# Patient Record
Sex: MALE | Race: WHITE | NOT HISPANIC OR LATINO | Employment: OTHER | URBAN - METROPOLITAN AREA
[De-identification: names, ages, dates, MRNs, and addresses within clinical notes are randomized per-mention and may not be internally consistent; named-entity substitution may affect disease eponyms.]

---

## 2018-09-26 ENCOUNTER — HOSPITAL ENCOUNTER (EMERGENCY)
Facility: HOSPITAL | Age: 73
Discharge: HOME | End: 2018-09-27
Attending: EMERGENCY MEDICINE | Admitting: EMERGENCY MEDICINE

## 2018-09-26 ENCOUNTER — APPOINTMENT (OUTPATIENT)
Dept: CT IMAGING | Facility: HOSPITAL | Age: 73
End: 2018-09-26

## 2018-09-26 DIAGNOSIS — R55 SYNCOPE, UNSPECIFIED SYNCOPE TYPE: Primary | ICD-10-CM

## 2018-09-26 DIAGNOSIS — R91.1 PULMONARY NODULE: ICD-10-CM

## 2018-09-26 DIAGNOSIS — R79.89 ELEVATED D-DIMER: ICD-10-CM

## 2018-09-26 LAB
ALBUMIN SERPL-MCNC: 4.43 G/DL (ref 3.2–4.8)
ALBUMIN/GLOB SERPL: 2 G/DL (ref 1.5–2.5)
ALP SERPL-CCNC: 78 U/L (ref 25–100)
ALT SERPL W P-5'-P-CCNC: 28 U/L (ref 7–40)
ANION GAP SERPL CALCULATED.3IONS-SCNC: 6 MMOL/L (ref 3–11)
APTT PPP: 25.4 SECONDS (ref 24–31)
AST SERPL-CCNC: 23 U/L (ref 0–33)
BASOPHILS # BLD AUTO: 0.03 10*3/MM3 (ref 0–0.2)
BASOPHILS NFR BLD AUTO: 0.5 % (ref 0–1)
BILIRUB SERPL-MCNC: 0.9 MG/DL (ref 0.3–1.2)
BUN BLD-MCNC: 18 MG/DL (ref 9–23)
BUN/CREAT SERPL: 23.4 (ref 7–25)
CALCIUM SPEC-SCNC: 9.3 MG/DL (ref 8.7–10.4)
CHLORIDE SERPL-SCNC: 104 MMOL/L (ref 99–109)
CO2 SERPL-SCNC: 30 MMOL/L (ref 20–31)
CREAT BLD-MCNC: 0.77 MG/DL (ref 0.6–1.3)
D DIMER PPP FEU-MCNC: 0.92 MG/L (FEU) (ref 0–0.5)
DEPRECATED RDW RBC AUTO: 48.8 FL (ref 37–54)
EOSINOPHIL # BLD AUTO: 0.37 10*3/MM3 (ref 0–0.3)
EOSINOPHIL NFR BLD AUTO: 6.7 % (ref 0–3)
ERYTHROCYTE [DISTWIDTH] IN BLOOD BY AUTOMATED COUNT: 13.7 % (ref 11.3–14.5)
GFR SERPL CREATININE-BSD FRML MDRD: 100 ML/MIN/1.73
GLOBULIN UR ELPH-MCNC: 2.2 GM/DL
GLUCOSE BLD-MCNC: 112 MG/DL (ref 70–100)
GLUCOSE BLDC GLUCOMTR-MCNC: 105 MG/DL (ref 70–130)
HCT VFR BLD AUTO: 44.4 % (ref 38.9–50.9)
HGB BLD-MCNC: 14.4 G/DL (ref 13.1–17.5)
IMM GRANULOCYTES # BLD: 0.01 10*3/MM3 (ref 0–0.03)
IMM GRANULOCYTES NFR BLD: 0.2 % (ref 0–0.6)
INR PPP: 1.05 (ref 0.91–1.09)
LYMPHOCYTES # BLD AUTO: 1.81 10*3/MM3 (ref 0.6–4.8)
LYMPHOCYTES NFR BLD AUTO: 32.9 % (ref 24–44)
MAGNESIUM SERPL-MCNC: 2 MG/DL (ref 1.3–2.7)
MCH RBC QN AUTO: 31.5 PG (ref 27–31)
MCHC RBC AUTO-ENTMCNC: 32.4 G/DL (ref 32–36)
MCV RBC AUTO: 97.2 FL (ref 80–99)
MONOCYTES # BLD AUTO: 0.42 10*3/MM3 (ref 0–1)
MONOCYTES NFR BLD AUTO: 7.6 % (ref 0–12)
NEUTROPHILS # BLD AUTO: 2.87 10*3/MM3 (ref 1.5–8.3)
NEUTROPHILS NFR BLD AUTO: 52.3 % (ref 41–71)
PLATELET # BLD AUTO: 172 10*3/MM3 (ref 150–450)
PMV BLD AUTO: 10.8 FL (ref 6–12)
POTASSIUM BLD-SCNC: 3.9 MMOL/L (ref 3.5–5.5)
PROT SERPL-MCNC: 6.6 G/DL (ref 5.7–8.2)
PROTHROMBIN TIME: 11 SECONDS (ref 9.6–11.5)
RBC # BLD AUTO: 4.57 10*6/MM3 (ref 4.2–5.76)
SODIUM BLD-SCNC: 140 MMOL/L (ref 132–146)
T4 FREE SERPL-MCNC: 1.36 NG/DL (ref 0.89–1.76)
TROPONIN I SERPL-MCNC: 0.04 NG/ML (ref 0–0.07)
TSH SERPL DL<=0.05 MIU/L-ACNC: 4.05 MIU/ML (ref 0.35–5.35)
WBC NRBC COR # BLD: 5.5 10*3/MM3 (ref 3.5–10.8)

## 2018-09-26 PROCEDURE — 96360 HYDRATION IV INFUSION INIT: CPT

## 2018-09-26 PROCEDURE — 85025 COMPLETE CBC W/AUTO DIFF WBC: CPT | Performed by: EMERGENCY MEDICINE

## 2018-09-26 PROCEDURE — 99285 EMERGENCY DEPT VISIT HI MDM: CPT

## 2018-09-26 PROCEDURE — 85379 FIBRIN DEGRADATION QUANT: CPT | Performed by: EMERGENCY MEDICINE

## 2018-09-26 PROCEDURE — 84443 ASSAY THYROID STIM HORMONE: CPT | Performed by: EMERGENCY MEDICINE

## 2018-09-26 PROCEDURE — 84439 ASSAY OF FREE THYROXINE: CPT | Performed by: EMERGENCY MEDICINE

## 2018-09-26 PROCEDURE — 85610 PROTHROMBIN TIME: CPT | Performed by: EMERGENCY MEDICINE

## 2018-09-26 PROCEDURE — 70450 CT HEAD/BRAIN W/O DYE: CPT

## 2018-09-26 PROCEDURE — 85730 THROMBOPLASTIN TIME PARTIAL: CPT | Performed by: EMERGENCY MEDICINE

## 2018-09-26 PROCEDURE — 80053 COMPREHEN METABOLIC PANEL: CPT | Performed by: EMERGENCY MEDICINE

## 2018-09-26 PROCEDURE — 83735 ASSAY OF MAGNESIUM: CPT | Performed by: EMERGENCY MEDICINE

## 2018-09-26 PROCEDURE — 82962 GLUCOSE BLOOD TEST: CPT

## 2018-09-26 PROCEDURE — 84484 ASSAY OF TROPONIN QUANT: CPT

## 2018-09-26 PROCEDURE — 93005 ELECTROCARDIOGRAM TRACING: CPT | Performed by: EMERGENCY MEDICINE

## 2018-09-26 RX ORDER — SODIUM CHLORIDE 0.9 % (FLUSH) 0.9 %
10 SYRINGE (ML) INJECTION AS NEEDED
Status: DISCONTINUED | OUTPATIENT
Start: 2018-09-26 | End: 2018-09-27 | Stop reason: HOSPADM

## 2018-09-26 RX ORDER — MELATONIN
2000 DAILY
COMMUNITY

## 2018-09-26 RX ORDER — MELOXICAM 15 MG/1
15 TABLET ORAL DAILY
COMMUNITY
End: 2020-06-15 | Stop reason: SDUPTHER

## 2018-09-26 RX ADMIN — SODIUM CHLORIDE 1000 ML: 9 INJECTION, SOLUTION INTRAVENOUS at 22:53

## 2018-09-27 ENCOUNTER — OFFICE VISIT (OUTPATIENT)
Dept: CARDIOLOGY | Facility: HOSPITAL | Age: 73
End: 2018-09-27

## 2018-09-27 ENCOUNTER — APPOINTMENT (OUTPATIENT)
Dept: CT IMAGING | Facility: HOSPITAL | Age: 73
End: 2018-09-27

## 2018-09-27 ENCOUNTER — HOSPITAL ENCOUNTER (OUTPATIENT)
Dept: CARDIOLOGY | Facility: HOSPITAL | Age: 73
Discharge: HOME | End: 2018-09-27

## 2018-09-27 ENCOUNTER — HOSPITAL ENCOUNTER (OUTPATIENT)
Dept: CARDIOLOGY | Facility: HOSPITAL | Age: 73
Discharge: HOME | End: 2018-09-27
Admitting: NURSE PRACTITIONER

## 2018-09-27 VITALS
BODY MASS INDEX: 27.76 KG/M2 | DIASTOLIC BLOOD PRESSURE: 49 MMHG | WEIGHT: 228 LBS | HEIGHT: 76 IN | SYSTOLIC BLOOD PRESSURE: 97 MMHG

## 2018-09-27 VITALS
DIASTOLIC BLOOD PRESSURE: 80 MMHG | OXYGEN SATURATION: 93 % | HEART RATE: 58 BPM | RESPIRATION RATE: 18 BRPM | HEIGHT: 76 IN | SYSTOLIC BLOOD PRESSURE: 127 MMHG | WEIGHT: 220 LBS | TEMPERATURE: 97.8 F | BODY MASS INDEX: 26.79 KG/M2

## 2018-09-27 VITALS
HEIGHT: 76 IN | RESPIRATION RATE: 18 BRPM | SYSTOLIC BLOOD PRESSURE: 108 MMHG | HEART RATE: 85 BPM | WEIGHT: 228 LBS | DIASTOLIC BLOOD PRESSURE: 67 MMHG | BODY MASS INDEX: 27.76 KG/M2 | TEMPERATURE: 97.6 F | OXYGEN SATURATION: 96 %

## 2018-09-27 DIAGNOSIS — I95.1 ORTHOSTATIC HYPOTENSION: ICD-10-CM

## 2018-09-27 DIAGNOSIS — R55 SYNCOPE, UNSPECIFIED SYNCOPE TYPE: ICD-10-CM

## 2018-09-27 DIAGNOSIS — R55 SYNCOPE, UNSPECIFIED SYNCOPE TYPE: Primary | ICD-10-CM

## 2018-09-27 DIAGNOSIS — R91.8 PULMONARY NODULES: ICD-10-CM

## 2018-09-27 PROBLEM — E55.9 VITAMIN D DEFICIENCY: Status: ACTIVE | Noted: 2018-09-27

## 2018-09-27 PROBLEM — I95.9 ARTERIAL HYPOTENSION: Status: ACTIVE | Noted: 2018-09-27

## 2018-09-27 PROBLEM — M19.90 ARTHRITIS: Status: ACTIVE | Noted: 2018-09-27

## 2018-09-27 LAB
BILIRUB UR QL STRIP: NEGATIVE
CLARITY UR: CLEAR
COLOR UR: YELLOW
GLUCOSE UR STRIP-MCNC: NEGATIVE MG/DL
HGB UR QL STRIP.AUTO: NEGATIVE
HOLD SPECIMEN: NORMAL
HOLD SPECIMEN: NORMAL
KETONES UR QL STRIP: ABNORMAL
LEUKOCYTE ESTERASE UR QL STRIP.AUTO: NEGATIVE
NITRITE UR QL STRIP: NEGATIVE
PH UR STRIP.AUTO: <=5 [PH] (ref 5–8)
PROT UR QL STRIP: NEGATIVE
SP GR UR STRIP: 1.02 (ref 1–1.03)
TROPONIN I SERPL-MCNC: 0 NG/ML (ref 0–0.07)
UROBILINOGEN UR QL STRIP: ABNORMAL
WHOLE BLOOD HOLD SPECIMEN: NORMAL
WHOLE BLOOD HOLD SPECIMEN: NORMAL

## 2018-09-27 PROCEDURE — 93786 AMBL BP MNTR W/SW REC ONLY: CPT

## 2018-09-27 PROCEDURE — 84484 ASSAY OF TROPONIN QUANT: CPT

## 2018-09-27 PROCEDURE — 93005 ELECTROCARDIOGRAM TRACING: CPT | Performed by: EMERGENCY MEDICINE

## 2018-09-27 PROCEDURE — 71275 CT ANGIOGRAPHY CHEST: CPT

## 2018-09-27 PROCEDURE — 93270 REMOTE 30 DAY ECG REV/REPORT: CPT

## 2018-09-27 PROCEDURE — 99204 OFFICE O/P NEW MOD 45 MIN: CPT | Performed by: NURSE PRACTITIONER

## 2018-09-27 PROCEDURE — 93306 TTE W/DOPPLER COMPLETE: CPT

## 2018-09-27 PROCEDURE — 0 IOPAMIDOL PER 1 ML: Performed by: EMERGENCY MEDICINE

## 2018-09-27 PROCEDURE — 81003 URINALYSIS AUTO W/O SCOPE: CPT | Performed by: EMERGENCY MEDICINE

## 2018-09-27 RX ADMIN — IOPAMIDOL 75 ML: 755 INJECTION, SOLUTION INTRAVENOUS at 00:40

## 2018-09-27 NOTE — PROGRESS NOTES
Encounter Date:09/27/2018      Patient ID: Chris Velásquez is a 72 y.o. male.        Subjective:     Chief Complaint: Establish Care (s/p  ED visit for ) and Loss of Consciousness     History of Present Illness patient presents to the office today for ongoing evaluation of his syncopal event. He notes that he noted to his wife last night around 10 pm that he did not feel well while watching tv. He noted dizziness and lightheadedness while sitting on the couch. His wife witnessed his syncopal spell and she reports that he passed out onto the couch. No loss of bowel or bladder. She noted that he was unresponsive for roughly 1 minute. After syncopal spell, he was pale and diaphoretic. He denied cp or palpitations prior to the event. In route to the ED, EMS reported no outstanding vital findings with the exception of orthostatic hypotension and bradycardia (105/65 sitting, 98/66 standing, HR around 54-56 BPM). While in the ED the patient complained only of lingering nausea, stating that he otherwise feels fine. He denies any atypical occurrences during the day prior to this incident and additionally denies any similar presentation within the past.      The patient denies chest pain, palpitations, shortness of breath, cough, congestion, rhinorrhea, hematuria, hematochezia, dysuria, urgency, frequency, melena, or any other acute complaints at this time. Witnesses deny jerking,seizure-like activity, fall, or head trauma during the patient's syncopal episode.   Patient notes a long history of orthostatic hypotension with hx of syncope after standing for long periods of time when he was younger. He reports that this episode yesterday was different than those in the past.     Problem   Syncope   Vitamin D Deficiency   Orthostatic Hypotension   Arthritis       Past Surgical History:   Procedure Laterality Date   • ROTATOR CUFF REPAIR     • SHOULDER SURGERY      RIGHT       Allergies   Allergen Reactions   • Penicillins Hives          Current Outpatient Prescriptions:   •  cholecalciferol (VITAMIN D3) 1000 units tablet, Take 2,000 Units by mouth Daily., Disp: , Rfl:   •  meloxicam (MOBIC) 15 MG tablet, Take 15 mg by mouth Daily., Disp: , Rfl:   •  Misc Natural Products (OSTEO BI-FLEX JOINT SHIELD PO), Take 1 tablet by mouth Daily., Disp: , Rfl:   No current facility-administered medications for this visit.     The following portions of the chart were reviewed and updated as appropriate: Allergies, current medications, past family history, social history, past medical history.     Review of Systems   Constitution: Negative for chills, decreased appetite, diaphoresis, fever, weakness, malaise/fatigue, night sweats, weight gain and weight loss.   HENT: Negative for congestion, hearing loss, hoarse voice and nosebleeds.    Eyes: Negative for blurred vision, visual disturbance and visual halos.   Cardiovascular: Positive for syncope. Negative for chest pain, claudication, cyanosis, dyspnea on exertion, irregular heartbeat, leg swelling, near-syncope, orthopnea, palpitations and paroxysmal nocturnal dyspnea.   Respiratory: Negative for cough, hemoptysis, shortness of breath, sleep disturbances due to breathing, snoring, sputum production and wheezing.    Hematologic/Lymphatic: Negative for bleeding problem. Does not bruise/bleed easily.   Skin: Negative for dry skin, itching and rash.   Musculoskeletal: Positive for arthritis. Negative for falls, joint pain, joint swelling and myalgias.   Gastrointestinal: Negative for bloating, abdominal pain, constipation, diarrhea, flatus, heartburn, hematemesis, hematochezia, melena, nausea and vomiting.   Genitourinary: Negative for dysuria, frequency, hematuria, nocturia and urgency.   Neurological: Negative for excessive daytime sleepiness, dizziness, headaches, light-headedness and loss of balance.   Psychiatric/Behavioral: Negative for depression. The patient does not have insomnia and is not  "nervous/anxious.            Objective:     Vitals:    09/27/18 1331 09/27/18 1333 09/27/18 1334   BP: 108/72 109/77 108/67   BP Location: Right arm Left arm Left arm   Patient Position: Sitting Sitting Standing   Cuff Size: Adult     Pulse: 80 75 85   Resp: 18     Temp: 97.6 °F (36.4 °C)     TempSrc: Temporal Artery      SpO2: 96%     Weight: 103 kg (228 lb)     Height: 193 cm (76\")           Physical Exam   Constitutional: He is oriented to person, place, and time. He appears well-developed and well-nourished. He is active and cooperative. No distress.   HENT:   Head: Normocephalic and atraumatic.   Mouth/Throat: Oropharynx is clear and moist.   Eyes: Pupils are equal, round, and reactive to light. Conjunctivae and EOM are normal.   Neck: Normal range of motion. Neck supple. No JVD present. No tracheal deviation present. No thyromegaly present.   Cardiovascular: Normal rate, regular rhythm, normal heart sounds and intact distal pulses.    Pulmonary/Chest: Effort normal and breath sounds normal.   Abdominal: Soft. Bowel sounds are normal. He exhibits no distension. There is no tenderness.   Musculoskeletal: Normal range of motion.   Neurological: He is alert and oriented to person, place, and time.   Skin: Skin is warm, dry and intact.   Psychiatric: He has a normal mood and affect. His behavior is normal.   Nursing note and vitals reviewed.      Lab and Diagnostic Review:      Results for orders placed or performed during the hospital encounter of 09/26/18   Comprehensive Metabolic Panel   Result Value Ref Range    Glucose 112 (H) 70 - 100 mg/dL    BUN 18 9 - 23 mg/dL    Creatinine 0.77 0.60 - 1.30 mg/dL    Sodium 140 132 - 146 mmol/L    Potassium 3.9 3.5 - 5.5 mmol/L    Chloride 104 99 - 109 mmol/L    CO2 30.0 20.0 - 31.0 mmol/L    Calcium 9.3 8.7 - 10.4 mg/dL    Total Protein 6.6 5.7 - 8.2 g/dL    Albumin 4.43 3.20 - 4.80 g/dL    ALT (SGPT) 28 7 - 40 U/L    AST (SGOT) 23 0 - 33 U/L    Alkaline Phosphatase 78 25 - " 100 U/L    Total Bilirubin 0.9 0.3 - 1.2 mg/dL    eGFR Non African Amer 100 >60 mL/min/1.73    Globulin 2.2 gm/dL    A/G Ratio 2.0 1.5 - 2.5 g/dL    BUN/Creatinine Ratio 23.4 7.0 - 25.0    Anion Gap 6.0 3.0 - 11.0 mmol/L   Magnesium   Result Value Ref Range    Magnesium 2.0 1.3 - 2.7 mg/dL   CBC Auto Differential   Result Value Ref Range    WBC 5.50 3.50 - 10.80 10*3/mm3    RBC 4.57 4.20 - 5.76 10*6/mm3    Hemoglobin 14.4 13.1 - 17.5 g/dL    Hematocrit 44.4 38.9 - 50.9 %    MCV 97.2 80.0 - 99.0 fL    MCH 31.5 (H) 27.0 - 31.0 pg    MCHC 32.4 32.0 - 36.0 g/dL    RDW 13.7 11.3 - 14.5 %    RDW-SD 48.8 37.0 - 54.0 fl    MPV 10.8 6.0 - 12.0 fL    Platelets 172 150 - 450 10*3/mm3    Neutrophil % 52.3 41.0 - 71.0 %    Lymphocyte % 32.9 24.0 - 44.0 %    Monocyte % 7.6 0.0 - 12.0 %    Eosinophil % 6.7 (H) 0.0 - 3.0 %    Basophil % 0.5 0.0 - 1.0 %    Immature Grans % 0.2 0.0 - 0.6 %    Neutrophils, Absolute 2.87 1.50 - 8.30 10*3/mm3    Lymphocytes, Absolute 1.81 0.60 - 4.80 10*3/mm3    Monocytes, Absolute 0.42 0.00 - 1.00 10*3/mm3    Eosinophils, Absolute 0.37 (H) 0.00 - 0.30 10*3/mm3    Basophils, Absolute 0.03 0.00 - 0.20 10*3/mm3    Immature Grans, Absolute 0.01 0.00 - 0.03 10*3/mm3   Protime-INR   Result Value Ref Range    Protime 11.0 9.6 - 11.5 Seconds    INR 1.05 0.91 - 1.09   aPTT   Result Value Ref Range    PTT 25.4 24.0 - 31.0 seconds   T4, Free   Result Value Ref Range    Free T4 1.36 0.89 - 1.76 ng/dL   TSH   Result Value Ref Range    TSH 4.055 0.350 - 5.350 mIU/mL   Urinalysis With Microscopic If Indicated (No Culture) - Urine, Clean Catch   Result Value Ref Range    Color, UA Yellow Yellow, Straw    Appearance, UA Clear Clear    pH, UA <=5.0 5.0 - 8.0    Specific Gravity, UA 1.020 1.001 - 1.030    Glucose, UA Negative Negative    Ketones, UA Trace (A) Negative    Bilirubin, UA Negative Negative    Blood, UA Negative Negative    Protein, UA Negative Negative    Leuk Esterase, UA Negative Negative    Nitrite, UA  Negative Negative    Urobilinogen, UA 0.2 E.U./dL 0.2 - 1.0 E.U./dL   D-dimer, Quantitative   Result Value Ref Range    D-Dimer, Quantitative 0.92 (H) 0.00 - 0.50 mg/L (FEU)   POC Glucose Once   Result Value Ref Range    Glucose 105 70 - 130 mg/dL   POC Troponin, Rapid   Result Value Ref Range    Troponin I 0.04 0.00 - 0.07 ng/mL   POC Troponin, Rapid   Result Value Ref Range    Troponin I 0.00 0.00 - 0.07 ng/mL   EKG 9/26/18: Sinus bradycardia with sinus arrhythmia at 52 bpm  EKG 2nd set: NSR at 62 bpm  Ct Head:   Brain:  Mild decreased attenuation of the supratentorial white matter is   likely secondary to chronic microvascular ischemia.  No hemorrhage.    Ventricles:  Ventricular and subarachnoid spaces are age appropriate.    Bones/joints:  Unremarkable.  No acute fracture.    Soft tissues:  Unremarkable.    Sinuses:  Mild paranasal sinus disease.    Mastoid air cells:  Unremarkable as visualized.  No mastoid effusion.   CT chest: No evidence of pulmonary embolus.       5 mm right middle lobe nodule and 6 mm right lower lobe nodule.  For low-risk   patients recommend follow-up chest CT at 3-6 months.  If unchanged consider an   additional follow-up CT at 18-24 months.  For high-risk patients (smoking   history or other known risk factors) initial follow-up chest CT at 3-6 months   and if unchanged, 18-24 months.       Assessment and Plan:         1. Syncope, unspecified syncope type    - Cardiac Event Monitor to rule out SSS, pauses and arrhthymias  - 24 Hour Blood Pressure Monitor  - Adult Transthoracic Echo Complete W/ Cont if Necessary Per Protocol; Future    2. Orthostatic hypotension  24 hour blood pressure monitor    3. Pulmonary nodules  Patient to establish with pulmonary in the near future    Ongoing plan of care based on results of above mentioned tests    It has been a pleasure to participate in the care of this patient.  Patient was instructed to call [facility] with any questions,  concerns, or worsening symptoms.    * Please note that portions of this note were completed with a voice recognition program. Efforts were made to edit the dictation but occasionally words are transcribed.

## 2018-09-28 ENCOUNTER — TELEPHONE (OUTPATIENT)
Dept: CARDIOLOGY | Facility: HOSPITAL | Age: 73
End: 2018-09-28

## 2018-09-28 ENCOUNTER — DOCUMENTATION (OUTPATIENT)
Dept: CARDIOLOGY | Facility: HOSPITAL | Age: 73
End: 2018-09-28

## 2018-09-28 LAB
BH CV ECHO MEAS - AO ROOT AREA (BSA CORRECTED): 1.5
BH CV ECHO MEAS - AO ROOT AREA: 9.6 CM^2
BH CV ECHO MEAS - AO ROOT DIAM: 3.5 CM
BH CV ECHO MEAS - BSA(HAYCOCK): 2.3 M^2
BH CV ECHO MEAS - BSA: 2.3 M^2
BH CV ECHO MEAS - BZI_BMI: 29.3 KILOGRAMS/M^2
BH CV ECHO MEAS - BZI_METRIC_HEIGHT: 188 CM
BH CV ECHO MEAS - BZI_METRIC_WEIGHT: 103.4 KG
BH CV ECHO MEAS - EDV(CUBED): 151.3 ML
BH CV ECHO MEAS - EDV(MOD-SP2): 143 ML
BH CV ECHO MEAS - EDV(MOD-SP4): 157 ML
BH CV ECHO MEAS - EDV(TEICH): 137 ML
BH CV ECHO MEAS - EF(CUBED): 74.5 %
BH CV ECHO MEAS - EF(MOD-SP2): 53.1 %
BH CV ECHO MEAS - EF(MOD-SP4): 65 %
BH CV ECHO MEAS - EF(TEICH): 65.9 %
BH CV ECHO MEAS - ESV(CUBED): 38.5 ML
BH CV ECHO MEAS - ESV(MOD-SP2): 67 ML
BH CV ECHO MEAS - ESV(MOD-SP4): 55 ML
BH CV ECHO MEAS - ESV(TEICH): 46.7 ML
BH CV ECHO MEAS - FS: 36.6 %
BH CV ECHO MEAS - IVS/LVPW: 1
BH CV ECHO MEAS - IVSD: 1.1 CM
BH CV ECHO MEAS - LA DIMENSION: 4.5 CM
BH CV ECHO MEAS - LA/AO: 1.3
BH CV ECHO MEAS - LAD MAJOR: 6.7 CM
BH CV ECHO MEAS - LAT PEAK E' VEL: 12 CM/SEC
BH CV ECHO MEAS - LATERAL E/E' RATIO: 5.2
BH CV ECHO MEAS - LV DIASTOLIC VOL/BSA (35-75): 68.3 ML/M^2
BH CV ECHO MEAS - LV MASS(C)D: 238.3 GRAMS
BH CV ECHO MEAS - LV MASS(C)DI: 103.7 GRAMS/M^2
BH CV ECHO MEAS - LV MAX PG: 4 MMHG
BH CV ECHO MEAS - LV MEAN PG: 1.9 MMHG
BH CV ECHO MEAS - LV SYSTOLIC VOL/BSA (12-30): 23.9 ML/M^2
BH CV ECHO MEAS - LV V1 MAX: 100.2 CM/SEC
BH CV ECHO MEAS - LV V1 MEAN: 62.6 CM/SEC
BH CV ECHO MEAS - LV V1 VTI: 27.3 CM
BH CV ECHO MEAS - LVIDD: 5.3 CM
BH CV ECHO MEAS - LVIDS: 3.4 CM
BH CV ECHO MEAS - LVLD AP2: 9 CM
BH CV ECHO MEAS - LVLD AP4: 9.7 CM
BH CV ECHO MEAS - LVLS AP2: 7.5 CM
BH CV ECHO MEAS - LVLS AP4: 7.3 CM
BH CV ECHO MEAS - LVOT AREA (M): 4.2 CM^2
BH CV ECHO MEAS - LVOT AREA: 4 CM^2
BH CV ECHO MEAS - LVOT DIAM: 2.3 CM
BH CV ECHO MEAS - LVPWD: 1.1 CM
BH CV ECHO MEAS - MED PEAK E' VEL: 7.8 CM/SEC
BH CV ECHO MEAS - MEDIAL E/E' RATIO: 8
BH CV ECHO MEAS - MV A MAX VEL: 72.6 CM/SEC
BH CV ECHO MEAS - MV E MAX VEL: 63.7 CM/SEC
BH CV ECHO MEAS - MV E/A: 0.88
BH CV ECHO MEAS - PA ACC SLOPE: 602.9 CM/SEC^2
BH CV ECHO MEAS - PA ACC TIME: 0.16 SEC
BH CV ECHO MEAS - PA PR(ACCEL): 5.3 MMHG
BH CV ECHO MEAS - RAP SYSTOLE: 8 MMHG
BH CV ECHO MEAS - RVDD: 2.7 CM
BH CV ECHO MEAS - RVSP: 33 MMHG
BH CV ECHO MEAS - SI(CUBED): 49.1 ML/M^2
BH CV ECHO MEAS - SI(LVOT): 47.8 ML/M^2
BH CV ECHO MEAS - SI(MOD-SP2): 33.1 ML/M^2
BH CV ECHO MEAS - SI(MOD-SP4): 44.4 ML/M^2
BH CV ECHO MEAS - SI(TEICH): 39.3 ML/M^2
BH CV ECHO MEAS - SV(CUBED): 112.8 ML
BH CV ECHO MEAS - SV(LVOT): 109.8 ML
BH CV ECHO MEAS - SV(MOD-SP2): 76 ML
BH CV ECHO MEAS - SV(MOD-SP4): 102 ML
BH CV ECHO MEAS - SV(TEICH): 90.4 ML
BH CV ECHO MEAS - TR MAX PG: 25 MMHG
BH CV ECHO MEAS - TR MAX VEL: 249.9 CM/SEC
BH CV ECHO MEASUREMENTS AVERAGE E/E' RATIO: 6.43
BH CV VAS BP LEFT ARM: NORMAL MMHG
BH CV XLRA - RV BASE: 4.2 CM
BH CV XLRA - RV LENGTH: 6.3 CM
BH CV XLRA - RV MID: 3.5 CM
BH CV XLRA - TDI S': 11.5 CM/SEC
LEFT ATRIUM VOLUME INDEX: 40 ML/M^2
LV EF 2D ECHO EST: 65 %
MAXIMAL PREDICTED HEART RATE: 150 BPM
STRESS TARGET HR: 128 BPM

## 2018-09-28 NOTE — PROGRESS NOTES
[]09/27/18    Reason for Visit to [facility]: 24hr. Ambulatory Blood Pressure Monitor.    Mr. Velásquez saw Provider Deja Lara on 9/27/18 she ordered several tests, including a 24hr ambulatory blood pressure monitor, which he comes in for today.      The monitor was placed on his left arm & he was instructed in its use and had no questions.      He was provided with written instructions to refer to as well.  He will wear the monitor until tomorrow around 1500.      He will bring it back to our office tomorrow or mail it to us for download.      Results will be forwarded to Provider Deja Lara for analysis and treatment adjustment if indicated.    Mady Rogers 09/28/18 3:54 PM                         09/28/19  A 24 Hr. Ambulatory Blood Pressure Monitor was worn from 09/27/18 to 09/2817.  Results received and Given to ordering provider: GREGORIO Bolton for review and management of patient's symptoms. A copy of the results also sent to Medical Records to be scan in to [facility]>

## 2018-10-01 ENCOUNTER — OFFICE VISIT (OUTPATIENT)
Dept: PULMONOLOGY | Facility: CLINIC | Age: 73
End: 2018-10-01

## 2018-10-01 ENCOUNTER — TELEPHONE (OUTPATIENT)
Dept: CARDIOLOGY | Facility: HOSPITAL | Age: 73
End: 2018-10-01

## 2018-10-01 VITALS
RESPIRATION RATE: 16 BRPM | DIASTOLIC BLOOD PRESSURE: 88 MMHG | BODY MASS INDEX: 28.35 KG/M2 | TEMPERATURE: 97.3 F | WEIGHT: 228 LBS | HEART RATE: 62 BPM | HEIGHT: 75 IN | SYSTOLIC BLOOD PRESSURE: 138 MMHG | OXYGEN SATURATION: 98 %

## 2018-10-01 DIAGNOSIS — R06.02 SHORTNESS OF BREATH: Primary | ICD-10-CM

## 2018-10-01 DIAGNOSIS — R91.8 PULMONARY NODULES: ICD-10-CM

## 2018-10-01 PROCEDURE — 99204 OFFICE O/P NEW MOD 45 MIN: CPT | Performed by: INTERNAL MEDICINE

## 2018-10-01 PROCEDURE — 94375 RESPIRATORY FLOW VOLUME LOOP: CPT | Performed by: INTERNAL MEDICINE

## 2018-10-01 PROCEDURE — 94726 PLETHYSMOGRAPHY LUNG VOLUMES: CPT | Performed by: INTERNAL MEDICINE

## 2018-10-01 PROCEDURE — 94729 DIFFUSING CAPACITY: CPT | Performed by: INTERNAL MEDICINE

## 2018-10-01 NOTE — TELEPHONE ENCOUNTER
Reviewed 24 hour blood pressure monitor results with patient's wife.   AVG /65, with average hr 63 bpm

## 2018-10-01 NOTE — PROGRESS NOTES
Initial Pulmonary Consult Note    Subjective   Reason for consultation/Chief Complaint: Pulmonary Nodule    Chris Velásquez is a 72 y.o. male is being seen for consultation today at the request of Jj Scott MD    History of Present Illness  Mr. Velásquez is a 71yo M who is referred by the [facility] ED after the incidental finding of 2 small pulmonary nodules. He was seen in the ED on 9/26/18 after a syncopal episode at home. During the workup, he had a CTA of the chest performed which showed a 6mm nodule in the right lower lobe and a 5mm nodule in the right middle lobe.     He has not had any further syncopal events. He thinks that the episode of syncope was secondary to dehydration. However, he is being worked up by Cardiology. He had a 24 hour blood pressure recording and is now wearing a cardiac event monitor.     He is a lifelong nonsmoker. He does not have any family history of lung cancer. He denies any current pulmonary symptoms. He has not had any unintentional weight loss. He denies any fever or chills. He recently returned from vacation in the [place].     Active Ambulatory Problems     Diagnosis Date Noted   • Syncope 09/27/2018   • Vitamin D deficiency 09/27/2018   • Orthostatic hypotension 09/27/2018   • Arthritis 09/27/2018   • Pulmonary nodules 09/27/2018     Resolved Ambulatory Problems     Diagnosis Date Noted   • No Resolved Ambulatory Problems     Past Medical History:   Diagnosis Date   • Arthritis    • Lung nodule    • Neuropathy        Past Surgical History:   Procedure Laterality Date   • ROTATOR CUFF REPAIR     • SHOULDER SURGERY      RIGHT       Family History   Problem Relation Age of Onset   • Alcohol abuse Mother    • Liver disease Mother    • Alcohol abuse Father    • Liver disease Father    • Heart attack Father    • Cirrhosis Maternal Grandmother    • Alcohol abuse Maternal Grandmother    • Alcohol abuse Maternal Grandfather    • No Known Problems Paternal Grandmother    • No Known  "Problems Paternal Grandfather        Social History     Social History   • Marital status:      Spouse name: N/A   • Number of children: N/A   • Years of education: N/A     Occupational History   • Retired      semi retired     Social History Main Topics   • Smoking status: Never Smoker   • Smokeless tobacco: Never Used   • Alcohol use 1.2 oz/week     2 Glasses of wine per week      Comment: 3-4 DAYS A WEEK   • Drug use: No   • Sexual activity: Defer     Other Topics Concern   • Not on file     Social History Narrative    Caffeine Intake: 1- 2 servings per day    Patient lives at home with his wife       Allergies   Allergen Reactions   • Penicillins Hives       Current Outpatient Prescriptions   Medication Sig Dispense Refill   • cholecalciferol (VITAMIN D3) 1000 units tablet Take 2,000 Units by mouth Daily.     • meloxicam (MOBIC) 15 MG tablet Take 15 mg by mouth Daily.     • Misc Natural Products (OSTEO BI-FLEX JOINT SHIELD PO) Take 1 tablet by mouth Daily.       No current facility-administered medications for this visit.        Review of Systems   Constitutional: Negative.    HENT: Negative.    Eyes: Negative.    Respiratory: Negative.    Cardiovascular: Negative.    Gastrointestinal: Negative.    Endocrine: Negative.    Genitourinary: Negative.    Musculoskeletal: Positive for arthralgias.   Skin: Negative.    Allergic/Immunologic: Negative.    Neurological: Positive for syncope.   Hematological: Negative.    Psychiatric/Behavioral: Negative.          Objective   Blood pressure 138/88, pulse 62, temperature 97.3 °F (36.3 °C), resp. rate 16, height 189.2 cm (74.5\"), weight 103 kg (228 lb), SpO2 98 %.  Physical Exam   Constitutional: He is oriented to person, place, and time. He appears well-developed and well-nourished. No distress.   HENT:   Head: Normocephalic and atraumatic.   Mouth/Throat: Oropharynx is clear and moist.   Eyes: Pupils are equal, round, and reactive to light. Conjunctivae are normal. " No scleral icterus.   Neck: Normal range of motion. Neck supple. No tracheal deviation present. No thyromegaly present.   Cardiovascular: Normal rate, regular rhythm and normal heart sounds.    Pulmonary/Chest: Effort normal and breath sounds normal. No respiratory distress.   Abdominal: Soft. Bowel sounds are normal. There is no tenderness.   Musculoskeletal: Normal range of motion. He exhibits no edema.   Lymphadenopathy:     He has no cervical adenopathy.   Neurological: He is alert and oriented to person, place, and time. He exhibits normal muscle tone. Coordination normal.   Skin: Skin is warm and dry. No rash noted. No erythema.   Psychiatric: He has a normal mood and affect. His speech is normal and behavior is normal. Judgment normal.   Nursing note and vitals reviewed.      PFTs:  Full PFTs performed in clinic today.   There is no airway obstruction.  There is no restriction.  The DLCO is normal.     Imaging:  There is a 5mm nodule in the right middle lobe and a 6mm nodule in the right lower lobe.     Assessment/Plan   Chris was seen today for lung nodule.    Diagnoses and all orders for this visit:    Shortness of breath  -     Pulmonary Function Test    Pulmonary nodules        Discussion:  Mr. Velásquez is a 71yo M who is referred for incidental pulmonary nodules discovered during a workup for syncope.     1. Pulmonary Nodules  - 5mm and 6mm in size.   - Plan to repeat a CT scan of the chest in 6 months. If the nodules remain stable, plan to repeat another CT chest 24 months after the initial scan.   - If nodules are resolved on the next scan, there will be no need for further scans as he is a lifelong nonsmoker.     I have discussed the plan of care with the patient and his wife. They have stated understanding and will call with any further questions. He will follow up after his next CT chest.          I personally spent over half of a total 50 minutes face to face with the patient in counseling and  discussion and/or coordination of care as described above.       Nadeen V Case, DO  Pulmonary and Critical Care Medicine

## 2018-10-02 DIAGNOSIS — R91.8 LUNG NODULES: Primary | ICD-10-CM

## 2018-10-09 ENCOUNTER — TELEPHONE (OUTPATIENT)
Dept: CARDIOLOGY | Facility: HOSPITAL | Age: 73
End: 2018-10-09

## 2018-10-09 DIAGNOSIS — R55 SYNCOPE, UNSPECIFIED SYNCOPE TYPE: ICD-10-CM

## 2018-10-09 DIAGNOSIS — I47.29 NSVT (NONSUSTAINED VENTRICULAR TACHYCARDIA) (HCC): Primary | ICD-10-CM

## 2018-10-09 NOTE — TELEPHONE ENCOUNTER
Patient is currently wearing a 30 day event monitor and a 5 beat run of VTACH at 7:25 am today. Patient was asymptomatic. Patient to be scheduled for stress test in very near future. Continue wearing event monitor. Patient and patient's wife verbalized understanding.

## 2018-10-15 DIAGNOSIS — R94.31 ABNORMAL ELECTROCARDIOGRAM: ICD-10-CM

## 2018-10-15 DIAGNOSIS — I47.20 V-TACH (HCC): Primary | ICD-10-CM

## 2018-10-23 ENCOUNTER — HOSPITAL ENCOUNTER (OUTPATIENT)
Dept: CARDIOLOGY | Facility: HOSPITAL | Age: 73
Discharge: HOME | End: 2018-10-23

## 2018-10-23 VITALS
HEIGHT: 74 IN | BODY MASS INDEX: 29.14 KG/M2 | SYSTOLIC BLOOD PRESSURE: 130 MMHG | HEART RATE: 65 BPM | DIASTOLIC BLOOD PRESSURE: 82 MMHG | WEIGHT: 227.07 LBS

## 2018-10-23 DIAGNOSIS — I47.20 V-TACH (HCC): ICD-10-CM

## 2018-10-23 DIAGNOSIS — R94.31 ABNORMAL ELECTROCARDIOGRAM: ICD-10-CM

## 2018-10-23 LAB
BH CV STRESS BP STAGE 1: NORMAL
BH CV STRESS DURATION MIN STAGE 1: 3
BH CV STRESS DURATION MIN STAGE 2: 2
BH CV STRESS DURATION SEC STAGE 1: 0
BH CV STRESS DURATION SEC STAGE 2: 0
BH CV STRESS GRADE STAGE 1: 10
BH CV STRESS GRADE STAGE 2: 12
BH CV STRESS HR STAGE 1: 120
BH CV STRESS HR STAGE 2: 153
BH CV STRESS METS STAGE 1: 5
BH CV STRESS METS STAGE 2: 7.5
BH CV STRESS PROTOCOL 1: NORMAL
BH CV STRESS RECOVERY BP: NORMAL MMHG
BH CV STRESS RECOVERY HR: 86 BPM
BH CV STRESS SPEED STAGE 1: 1.7
BH CV STRESS SPEED STAGE 2: 2.5
BH CV STRESS STAGE 1: 1
BH CV STRESS STAGE 2: 2
LV EF NUC BP: 56 %
MAXIMAL PREDICTED HEART RATE: 150 BPM
PERCENT MAX PREDICTED HR: 102 %
STRESS BASELINE BP: NORMAL MMHG
STRESS BASELINE HR: 55 BPM
STRESS PERCENT HR: 120 %
STRESS POST ESTIMATED WORKLOAD: 7 METS
STRESS POST EXERCISE DUR MIN: 5 MIN
STRESS POST EXERCISE DUR SEC: 0 SEC
STRESS POST PEAK BP: NORMAL MMHG
STRESS POST PEAK HR: 153 BPM
STRESS TARGET HR: 128 BPM

## 2018-10-23 PROCEDURE — 93017 CV STRESS TEST TRACING ONLY: CPT

## 2018-10-23 PROCEDURE — 78452 HT MUSCLE IMAGE SPECT MULT: CPT | Performed by: INTERNAL MEDICINE

## 2018-10-23 PROCEDURE — 93018 CV STRESS TEST I&R ONLY: CPT | Performed by: INTERNAL MEDICINE

## 2018-10-23 PROCEDURE — 78452 HT MUSCLE IMAGE SPECT MULT: CPT

## 2018-10-23 PROCEDURE — 0 TECHNETIUM SESTAMIBI: Performed by: NURSE PRACTITIONER

## 2018-10-23 PROCEDURE — A9500 TC99M SESTAMIBI: HCPCS | Performed by: NURSE PRACTITIONER

## 2018-10-23 RX ADMIN — TECHNETIUM TC 99M SESTAMIBI 1 DOSE: 1 INJECTION INTRAVENOUS at 09:45

## 2018-10-23 RX ADMIN — TECHNETIUM TC 99M SESTAMIBI 1 DOSE: 1 INJECTION INTRAVENOUS at 08:05

## 2018-11-08 PROCEDURE — 93272 ECG/REVIEW INTERPRET ONLY: CPT | Performed by: INTERNAL MEDICINE

## 2018-11-14 ENCOUNTER — TELEPHONE (OUTPATIENT)
Dept: CARDIOLOGY | Facility: HOSPITAL | Age: 73
End: 2018-11-14

## 2018-11-14 NOTE — TELEPHONE ENCOUNTER
Left voicemail to review event monitor. Patient had 2 brief runs of NSVT with normal stress. WIll refer to Cardiology for continued followup

## 2018-11-16 ENCOUNTER — TELEPHONE (OUTPATIENT)
Dept: CARDIOLOGY | Facility: HOSPITAL | Age: 73
End: 2018-11-16

## 2018-11-16 DIAGNOSIS — I47.29 NSVT (NONSUSTAINED VENTRICULAR TACHYCARDIA) (HCC): Primary | ICD-10-CM

## 2018-11-16 DIAGNOSIS — I95.1 ORTHOSTATIC HYPOTENSION: ICD-10-CM

## 2018-11-16 NOTE — TELEPHONE ENCOUNTER
Reviewed event monitor with wife which showed 2 NSVT runs lasting 4 beats, 5 beats. Patient is asymptomatic. Patient to establish with Cardiology. Normal stress test recently.

## 2018-11-28 NOTE — PROGRESS NOTES
[facility] AT [address], [address]  [place], [place], [address] [phone]  WWW.[facility]           OUTPATIENT CLINIC CONSULTATION NOTE    Patient Care Team/Referring Provider:  Patient Care Team:  Chris Leija MD as PCP - General (Internal Medicine)    Subjective:   Reason for consultation:   Chief Complaint   Patient presents with   • NSVT   • Loss of Consciousness       HPI:    Chris Velásquez is a 72 y.o. male.  History of Present Illness    The patient has a history of orthostatic hypotension as a child, syncope, arthritis, and pulmonary nodules.  The patient presents today for further evaluation of syncope.    He notes that at the end of September he had a syncopal episode.  He states he was sitting home watching TV when he suddenly felt strange he raised up and then passed out without warning.  He notes that he had some mild epigastric pain with pressure at that time.  He was severely diaphoretic after the episode.  The patient was watching a TV episode about a liver transplant.  Historically he has had difficulty watching other people have blood drawn or have other forms of trauma.    He also notes that prior to this episode he had had several days with limited fluid intake and this particular day he has only had 4 ounces of water at breakfast and a diet Pepsi.  He has not had any recurrent episodes since.  He denies any chest pain, shortness of breath, lower extremity edema, orthopnea, or PND.  He does drink alcohol, 2-3 glasses of wine 3-4 days a week.  He denies tobacco or drug use.  He notes that his father had a MI at age 72, but had multiple comorbidities prior to this event.  He has not followed with a cardiologist in the past.    PFSH:  Patient Active Problem List   Diagnosis   • Syncope   • Vitamin D deficiency   • Orthostatic hypotension   • Arthritis   • Pulmonary nodules         Current Outpatient Medications:   •  cholecalciferol  "(VITAMIN D3) 1000 units tablet, Take 2,000 Units by mouth Daily., Disp: , Rfl:   •  meloxicam (MOBIC) 15 MG tablet, Take 15 mg by mouth Daily., Disp: , Rfl:   •  Misc Natural Products (OSTEO BI-FLEX JOINT SHIELD PO), Take 1 tablet by mouth Daily., Disp: , Rfl:     Allergies   Allergen Reactions   • Penicillins Hives       Social History     Socioeconomic History   • Marital status:      Spouse name: Not on file   • Number of children: Not on file   • Years of education: Not on file   • Highest education level: Not on file   Occupational History   • Occupation: Retired     Comment: semi retired   Tobacco Use   • Smoking status: Never Smoker   • Smokeless tobacco: Never Used   Substance and Sexual Activity   • Alcohol use: Yes     Alcohol/week: 1.2 oz     Types: 10 Glasses of wine per week     Comment: 2 to 3 glasses, 3 to 4 days a week   • Drug use: No   • Sexual activity: Not Currently     Partners: Female     Birth control/protection: None   Social History Narrative    Caffeine Intake: 1- 2 servings per day    Patient lives at home with his wife     Family History   Problem Relation Age of Onset   • Alcohol abuse Mother    • Liver disease Mother    • Alcohol abuse Father    • Liver disease Father    • Heart attack Father         primary cause believed to be liver failure   • Cirrhosis Maternal Grandmother    • Alcohol abuse Maternal Grandmother    • Alcohol abuse Maternal Grandfather    • No Known Problems Paternal Grandmother    • No Known Problems Paternal Grandfather        Review of Systems:  Positive for syncope.  Negative for exertional chest pain, dyspnea with exertion, orthopnea, PND, lower extremity edema, palpitations, lightheadedness.  All other systems reviewed and are negative.    Objective:   Blood pressure 110/68, pulse 62, height 182.9 cm (72\"), weight 106 kg (233 lb).  CONSTITUTIONAL: Well-nourished. In no acute distress.   SKIN: Warm and dry. No rashes noted  HEENT: Head is normocephalic " and atraumatic.  Mucous membranes are pink and moist.   NECK: Supple without masses or thyromegaly. There is no jugular venous distention at 30°.  LUNGS: Normal effort. Clear to auscultation bilaterally without wheezing, rhonchi, or rales noted.   CARDIOVASCULAR: The heart has a regular rate and rhythm with a normal S1 and S2. There is no murmur, gallop, rub, or click appreciated.   PERIPHERAL VASCULAR: Carotid upstroke is 2+ bilaterally and without bruits. Radial pulses are 2+ bilaterally. There is no lower extremity edema.   ABDOMEN: Soft with no tenderness with palpitation. No hepatosplenomegaly  MUSCULOSKELETAL: Normal gait. No digital cyanosis  NEUROLOGICAL: CN II - XII grossly intact  PSYCHIATRIC: Alert, orientated x 3, appropriate affect     Labs:  BUN   Date Value Ref Range Status   09/26/2018 18 9 - 23 mg/dL Final     Creatinine   Date Value Ref Range Status   09/26/2018 0.77 0.60 - 1.30 mg/dL Final     Potassium   Date Value Ref Range Status   09/26/2018 3.9 3.5 - 5.5 mmol/L Final     ALT (SGPT)   Date Value Ref Range Status   09/26/2018 28 7 - 40 U/L Final     AST (SGOT)   Date Value Ref Range Status   09/26/2018 23 0 - 33 U/L Final     WBC   Date Value Ref Range Status   09/26/2018 5.50 3.50 - 10.80 10*3/mm3 Final     Hemoglobin   Date Value Ref Range Status   09/26/2018 14.4 13.1 - 17.5 g/dL Final     Hematocrit   Date Value Ref Range Status   09/26/2018 44.4 38.9 - 50.9 % Final     Platelets   Date Value Ref Range Status   09/26/2018 172 150 - 450 10*3/mm3 Final       No results found for: CHOL  No results found for: TRIG  No results found for: HDL  No components found for: LDLCALC  No results found for: VLDL  No results found for: LDLHDL    Diagnostic Data:      ECG 12 Lead  Date/Time: 11/29/2018 9:29 AM  Performed by: Chandler Flynn MD  Authorized by: Chandler Flynn MD   Comparison: compared with previous ECG from 9/26/2018  Similar to previous ECG  Rhythm: sinus rhythm  Rate: normal  BPM:  62  Clinical impression: normal ECG  Comments:  ms   ms            TTE 9/2018  · Left ventricular systolic function is normal. Estimated EF = 65%.    30 day event monitor 10/2018  · 2 brief runs of NSVT  · No atrial fibrillation    Stress Test 10/23/2018  · Left ventricular ejection fraction is normal (Calculated EF = 56%).  · The exercise ECG portion of the stress test was negative for clinical and ECG evidence of myocardial ischemia. The Duke Treadmill Score was 5.0. The patient's exercise tolerance is mildly decreased. There were occasional PACs during recovery.  · The myocardial perfusion imaging portion of the stress test indicates a normal myocardial perfusion study with no evidence of ischemia.  · Impressions are consistent with a low risk study.    Assessment and Plan:   Chris was seen today for nsvt and loss of consciousness.    Diagnoses and all orders for this visit:    Syncope   -Symptoms seem consistent with a vasovagal episode considering he is watching a TV show on a traumatic event which has historically made him feel uneasy and lightheaded.  He was also likely dehydrated at that time.  Lastly he often has a little low normal blood pressure and has had symptoms consistent with orthostasis in the past  -No recurrence since initial episode.  -Cardiac testing has been unremarkable   -Would recommend consistent hydration with electrolytes included, continue cardiac exercise  -If patient develops more symptoms of lightheadedness in the future would consider the initiation of salt tablets and/or Florinef.  -If with recurrent syncope, can also consider a loop recorder and possibly LHC if with symptoms consistent with angina. The patient did have 2 short runs of NSVT on his event monitor      - Return in about 6 months (around 5/29/2019).    GREGORIO Adler obtained past medical, family history, social history, review of systems and functioned as a scribe for the remainder of the dictation  for Dr. Flynn.    I, Chandler Flynn MD, personally performed the services as scribed by the above named individual. I have made any necessary edits and it is both accurate and complete.     Chandler Flynn MD, MSc, [facility]  Interventional Cardiology  [facility] at [facility]

## 2018-11-29 ENCOUNTER — CONSULT (OUTPATIENT)
Dept: CARDIOLOGY | Facility: CLINIC | Age: 73
End: 2018-11-29

## 2018-11-29 VITALS
BODY MASS INDEX: 31.56 KG/M2 | WEIGHT: 233 LBS | DIASTOLIC BLOOD PRESSURE: 68 MMHG | HEIGHT: 72 IN | SYSTOLIC BLOOD PRESSURE: 110 MMHG | HEART RATE: 62 BPM

## 2018-11-29 DIAGNOSIS — R55 VASOVAGAL SYNCOPE: Primary | ICD-10-CM

## 2018-11-29 PROCEDURE — 93000 ELECTROCARDIOGRAM COMPLETE: CPT | Performed by: INTERNAL MEDICINE

## 2018-11-29 PROCEDURE — 99204 OFFICE O/P NEW MOD 45 MIN: CPT | Performed by: INTERNAL MEDICINE

## 2019-03-25 ENCOUNTER — HOSPITAL ENCOUNTER (OUTPATIENT)
Dept: CT IMAGING | Facility: HOSPITAL | Age: 74
Discharge: HOME | End: 2019-03-25
Admitting: INTERNAL MEDICINE

## 2019-03-25 DIAGNOSIS — R91.8 LUNG NODULES: ICD-10-CM

## 2019-03-25 PROCEDURE — 71250 CT THORAX DX C-: CPT

## 2019-04-18 ENCOUNTER — OFFICE VISIT (OUTPATIENT)
Dept: PULMONOLOGY | Facility: CLINIC | Age: 74
End: 2019-04-18

## 2019-04-18 VITALS
DIASTOLIC BLOOD PRESSURE: 82 MMHG | TEMPERATURE: 98.6 F | HEIGHT: 72 IN | OXYGEN SATURATION: 97 % | HEART RATE: 76 BPM | WEIGHT: 226 LBS | BODY MASS INDEX: 30.61 KG/M2 | SYSTOLIC BLOOD PRESSURE: 130 MMHG

## 2019-04-18 DIAGNOSIS — R91.8 PULMONARY NODULES: Primary | ICD-10-CM

## 2019-04-18 PROCEDURE — 99213 OFFICE O/P EST LOW 20 MIN: CPT | Performed by: INTERNAL MEDICINE

## 2019-04-18 PROCEDURE — 94375 RESPIRATORY FLOW VOLUME LOOP: CPT | Performed by: INTERNAL MEDICINE

## 2019-04-18 RX ORDER — DOXAZOSIN 2 MG/1
TABLET ORAL
COMMUNITY
Start: 2019-03-20 | End: 2019-06-17 | Stop reason: SDDI

## 2019-04-18 NOTE — PROGRESS NOTES
"Pulmonary Office Follow Up      Subjective   Chief Complaint: Pulmonary Nodules    Chris Velásquez is a 73 y.o. male is being seen in follow up for Pulmonary Nodules    History of Present Illness    Mr. Velásquez is a 74yo M who was referred by the [facility] ED after the incidental finding of 2 small pulmonary nodules. He was seen in the ED on 9/26/18 after a syncopal episode at home. During the workup, he had a CTA of the chest performed which showed a 6mm nodule in the right lower lobe and a 5mm nodule in the right middle lobe.     He returns today for follow up after repeat CT scan of the chest. He has not had any further syncopal episodes. There was no etiology found for his syncope and it was attributed to dehydration. He is trying to drink more water and eat more salt per recommendations. He has not had any respiratory illnesses or new respiratory symptoms since his last visit. He is active and is only limited by foot and knee pain.       The following portions of the patient's history were reviewed and updated as appropriate: allergies, current medications, past family history, past medical history, past social history, past surgical history and problem list.    Review of Systems   Constitutional: Negative.    HENT: Negative.    Eyes: Negative.    Respiratory: Negative.    Cardiovascular: Negative.    Gastrointestinal: Negative.    Endocrine: Negative.    Genitourinary: Positive for frequency.   Musculoskeletal: Positive for arthralgias.   Skin: Negative.    Allergic/Immunologic: Negative.    Neurological: Negative.    Hematological: Negative.    Psychiatric/Behavioral: Negative.           Objective   Blood pressure 130/82, pulse 76, temperature 98.6 °F (37 °C), height 182.9 cm (72\"), weight 103 kg (226 lb), SpO2 97 %.  Physical Exam   Constitutional: He is oriented to person, place, and time. He appears well-developed and well-nourished. No distress.   HENT:   Head: Normocephalic and atraumatic.   Mouth/Throat: " Oropharynx is clear and moist.   Eyes: Conjunctivae are normal. Pupils are equal, round, and reactive to light. No scleral icterus.   Neck: Normal range of motion. Neck supple. No tracheal deviation present. No thyromegaly present.   Cardiovascular: Normal rate, regular rhythm and normal heart sounds.   Pulmonary/Chest: Effort normal and breath sounds normal. No respiratory distress.   Abdominal: Soft. Bowel sounds are normal. There is no tenderness.   Musculoskeletal: Normal range of motion. He exhibits no edema.   Lymphadenopathy:     He has no cervical adenopathy.   Neurological: He is alert and oriented to person, place, and time. He exhibits normal muscle tone. Coordination normal.   Skin: Skin is warm and dry. No rash noted. No erythema.   Psychiatric: He has a normal mood and affect. His speech is normal and behavior is normal. Judgment normal.   Nursing note and vitals reviewed.      PFTs:  Spirometry performed in clinic today.   There is no airway obstruction.   FEV1 is stable when compared to PFTs from 10/1/18.      Imaging:  CT chest from 3/25/19 reviewed. A 5 mm nodule was noted in the right middle lobe and 09/27/2018. There are 2 additional subcentimeter nodules which are pleural-based, one in the right and one in the left lung. Each of these findings is almost certainly a benign process. There has been no change since 09/27/2018.    Assessment/Plan   Chris was seen today for lung nodule.    Diagnoses and all orders for this visit:    Pulmonary nodules  -     Spirometry Without Bronchodilator        Discussion:  Mr. Velásquez is a 72yo M who is followed for incidental pulmonary nodules discovered during a workup for syncope.      1. Pulmonary Nodules  - Stable when compared to CT from 9/27/18.   - Repeat CT chest in 1 year. If stable at that time, he will not need any further screening.   - Lifelong nonsmoker.      Follow up in 1 year after repeat CT chest. Instructed to call if he needs a sooner  appointment.       Nadeen GUERRIER Case, DO  Pulmonary and Critical Care Medicine  Note Electronically Signed

## 2019-04-19 DIAGNOSIS — R91.8 LUNG NODULES: Primary | ICD-10-CM

## 2019-06-10 NOTE — PROGRESS NOTES
[facility] AT [address], [address]  [place], [place], [address] [phone]  [facility]           OUTPATIENT CLINIC FOLLOW UP NOTE    Patient Care Team:  Patient Care Team:  Chris Leiaj MD as PCP - General (Internal Medicine)  Rossana Thompson APRN as PCP - Claims Attributed    Subjective:      Chief Complaint   Patient presents with   • Loss of Consciousness       HPI:    Chris Velásquez is a 73 y.o. male.  The patient has a history of orthostatic hypotension as a child, an episode of vasovagal syncope, arthritis, and pulmonary nodules.  He presents today for follow-up.    Since his last visit the patient has not had any recurrent syncopal episodes.  He is try to stay more hydrated.  Denies chest pain, dyspnea, palpitations.    Review of Systems:  Negative for exertional chest pain, dyspnea with exertion, orthopnea, PND, lower extremity edema, palpitations, lightheadedness, syncope.     PFSH:  Patient Active Problem List   Diagnosis   • Syncope   • Vitamin D deficiency   • Orthostatic hypotension   • Arthritis   • Pulmonary nodules         Current Outpatient Medications:   •  cholecalciferol (VITAMIN D3) 1000 units tablet, Take 2,000 Units by mouth Daily., Disp: , Rfl:   •  meloxicam (MOBIC) 15 MG tablet, Take 15 mg by mouth Daily., Disp: , Rfl:   •  Misc Natural Products (OSTEO BI-FLEX JOINT SHIELD PO), Take 1 tablet by mouth Daily., Disp: , Rfl:   •  doxazosin (CARDURA) 2 MG tablet, , Disp: , Rfl:     Allergies   Allergen Reactions   • Penicillins Hives        reports that he has never smoked. He has never used smokeless tobacco.    Family History   Problem Relation Age of Onset   • Alcohol abuse Mother    • Liver disease Mother    • Alcohol abuse Father    • Liver disease Father    • Heart attack Father         primary cause believed to be liver failure   • Cirrhosis Maternal Grandmother    • Alcohol abuse Maternal Grandmother    • Alcohol abuse  "Maternal Grandfather    • No Known Problems Paternal Grandmother    • No Known Problems Paternal Grandfather          Objective:   Physical exam:  Blood pressure 116/72, pulse 54, height 185.4 cm (73\"), weight 102 kg (225 lb), SpO2 98 %.  CONSTITUTIONAL: No acute distress, normal affect  RESPIRATORY: Normal effort. Clear to auscultation bilaterally without wheezing or rales  CARDIOVASCULAR: Regular rate and rhythm with normal S1 and S2. Without murmur, gallop or rub.  PERIPHERAL VASCULAR: Normal radial pulses bilaterally. There is no peripheral edema bilaterally.        Labs:    BUN   Date Value Ref Range Status   09/26/2018 18 9 - 23 mg/dL Final     Creatinine   Date Value Ref Range Status   09/26/2018 0.77 0.60 - 1.30 mg/dL Final     Potassium   Date Value Ref Range Status   09/26/2018 3.9 3.5 - 5.5 mmol/L Final     ALT (SGPT)   Date Value Ref Range Status   09/26/2018 28 7 - 40 U/L Final     AST (SGOT)   Date Value Ref Range Status   09/26/2018 23 0 - 33 U/L Final       No results found for: CHOL  No results found for: TRIG  No results found for: HDL  No results found for: LDL  No components found for: LDLDIRECTC    Diagnostic Data:    Procedures  TTE 9/2018  · Left ventricular systolic function is normal. Estimated EF = 65%.     30 day event monitor 10/2018  · 2 brief runs of NSVT  · No atrial fibrillation     Stress Test 10/23/2018  · Left ventricular ejection fraction is normal (Calculated EF = 56%).  · The exercise ECG portion of the stress test was negative for clinical and ECG evidence of myocardial ischemia. The Duke Treadmill Score was 5.0. The patient's exercise tolerance is mildly decreased. There were occasional PACs during recovery.  · The myocardial perfusion imaging portion of the stress test indicates a normal myocardial perfusion study with no evidence of ischemia.  · Impressions are consistent with a low risk study.    Assessment and Plan:   Chris was seen today for loss of " consciousness.    Diagnoses and all orders for this visit:    Vasovagal syncope  Orthostatic hypotension  -No repeat events.  Last episode in 11/2018 followed watching a traumatic event on television that made him uneasy and lightheaded.  Symptoms are most consistent with a vasovagal episode in the setting of historical lower blood pressure.  -Reticulocyte testing was unremarkable  -Continued to recommend hydration and exercise.    - Return in about 1 year (around 6/17/2020).    Chandler Flynn MD, MSc, [facility]  Interventional Cardiology  [facility]

## 2019-06-17 ENCOUNTER — OFFICE VISIT (OUTPATIENT)
Dept: CARDIOLOGY | Facility: CLINIC | Age: 74
End: 2019-06-17

## 2019-06-17 VITALS
WEIGHT: 225 LBS | OXYGEN SATURATION: 98 % | DIASTOLIC BLOOD PRESSURE: 72 MMHG | BODY MASS INDEX: 29.82 KG/M2 | HEART RATE: 54 BPM | SYSTOLIC BLOOD PRESSURE: 116 MMHG | HEIGHT: 73 IN

## 2019-06-17 DIAGNOSIS — R55 VASOVAGAL SYNCOPE: Primary | ICD-10-CM

## 2019-06-17 DIAGNOSIS — I95.1 ORTHOSTATIC HYPOTENSION: ICD-10-CM

## 2019-06-17 PROCEDURE — 99213 OFFICE O/P EST LOW 20 MIN: CPT | Performed by: INTERNAL MEDICINE

## 2019-08-27 ENCOUNTER — TRANSCRIBE ORDERS (OUTPATIENT)
Dept: ADMINISTRATIVE | Facility: HOSPITAL | Age: 74
End: 2019-08-27

## 2019-08-27 DIAGNOSIS — R60.0 EDEMA OF EXTREMITIES: Primary | ICD-10-CM

## 2019-08-27 DIAGNOSIS — I87.2 VENOUS INSUFFICIENCY: Primary | ICD-10-CM

## 2019-08-30 ENCOUNTER — HOSPITAL ENCOUNTER (OUTPATIENT)
Dept: CARDIOLOGY | Facility: HOSPITAL | Age: 74
Discharge: HOME | End: 2019-08-30
Admitting: INTERNAL MEDICINE

## 2019-08-30 VITALS — WEIGHT: 225 LBS | HEIGHT: 73 IN | BODY MASS INDEX: 29.82 KG/M2

## 2019-08-30 DIAGNOSIS — R60.0 EDEMA OF EXTREMITIES: ICD-10-CM

## 2019-08-30 LAB
BH CV ECHO MEAS - BSA(HAYCOCK): 2.3 M^2
BH CV ECHO MEAS - BSA: 2.3 M^2
BH CV ECHO MEAS - BZI_BMI: 29.7 KILOGRAMS/M^2
BH CV ECHO MEAS - BZI_METRIC_HEIGHT: 185.4 CM
BH CV ECHO MEAS - BZI_METRIC_WEIGHT: 102.1 KG
BH CV LOWER VASCULAR LEFT COMMON FEMORAL AUGMENT: NORMAL
BH CV LOWER VASCULAR LEFT COMMON FEMORAL COMPRESS: NORMAL
BH CV LOWER VASCULAR LEFT COMMON FEMORAL PHASIC: NORMAL
BH CV LOWER VASCULAR LEFT COMMON FEMORAL SPONT: NORMAL
BH CV LOWER VASCULAR LEFT DISTAL FEMORAL AUGMENT: NORMAL
BH CV LOWER VASCULAR LEFT DISTAL FEMORAL COMPRESS: NORMAL
BH CV LOWER VASCULAR LEFT DISTAL FEMORAL PHASIC: NORMAL
BH CV LOWER VASCULAR LEFT DISTAL FEMORAL SPONT: NORMAL
BH CV LOWER VASCULAR LEFT GASTRONEMIUS COMPRESS: NORMAL
BH CV LOWER VASCULAR LEFT GREATER SAPH AK COMPRESS: NORMAL
BH CV LOWER VASCULAR LEFT GREATER SAPH BK COMPRESS: NORMAL
BH CV LOWER VASCULAR LEFT LESSER SAPH COMPRESS: NORMAL
BH CV LOWER VASCULAR LEFT MID FEMORAL AUGMENT: NORMAL
BH CV LOWER VASCULAR LEFT MID FEMORAL COMPRESS: NORMAL
BH CV LOWER VASCULAR LEFT MID FEMORAL PHASIC: NORMAL
BH CV LOWER VASCULAR LEFT MID FEMORAL SPONT: NORMAL
BH CV LOWER VASCULAR LEFT PERONEAL COMPRESS: NORMAL
BH CV LOWER VASCULAR LEFT POPLITEAL AUGMENT: NORMAL
BH CV LOWER VASCULAR LEFT POPLITEAL COMPRESS: NORMAL
BH CV LOWER VASCULAR LEFT POPLITEAL PHASIC: NORMAL
BH CV LOWER VASCULAR LEFT POPLITEAL SPONT: NORMAL
BH CV LOWER VASCULAR LEFT POSTERIOR TIBIAL COMPRESS: NORMAL
BH CV LOWER VASCULAR LEFT PROFUNDA FEMORAL AUGMENT: NORMAL
BH CV LOWER VASCULAR LEFT PROFUNDA FEMORAL COMPRESS: NORMAL
BH CV LOWER VASCULAR LEFT PROFUNDA FEMORAL PHASIC: NORMAL
BH CV LOWER VASCULAR LEFT PROFUNDA FEMORAL SPONT: NORMAL
BH CV LOWER VASCULAR LEFT PROXIMAL FEMORAL AUGMENT: NORMAL
BH CV LOWER VASCULAR LEFT PROXIMAL FEMORAL COMPRESS: NORMAL
BH CV LOWER VASCULAR LEFT PROXIMAL FEMORAL PHASIC: NORMAL
BH CV LOWER VASCULAR LEFT PROXIMAL FEMORAL SPONT: NORMAL
BH CV LOWER VASCULAR LEFT SAPHENOFEMORAL JUNCTION COMPRESS: NORMAL
BH CV LOWER VASCULAR LEFT SAPHENOFEMORAL JUNCTION SPONT: NORMAL
BH CV LOWER VASCULAR RIGHT COMMON FEMORAL AUGMENT: NORMAL
BH CV LOWER VASCULAR RIGHT COMMON FEMORAL COMPRESS: NORMAL
BH CV LOWER VASCULAR RIGHT COMMON FEMORAL PHASIC: NORMAL
BH CV LOWER VASCULAR RIGHT COMMON FEMORAL SPONT: NORMAL
BH CV LOWER VASCULAR RIGHT DISTAL FEMORAL AUGMENT: NORMAL
BH CV LOWER VASCULAR RIGHT DISTAL FEMORAL COMPRESS: NORMAL
BH CV LOWER VASCULAR RIGHT DISTAL FEMORAL PHASIC: NORMAL
BH CV LOWER VASCULAR RIGHT DISTAL FEMORAL SPONT: NORMAL
BH CV LOWER VASCULAR RIGHT GASTRONEMIUS COMPRESS: NORMAL
BH CV LOWER VASCULAR RIGHT GREATER SAPH AK COMPRESS: NORMAL
BH CV LOWER VASCULAR RIGHT GREATER SAPH BK COMPRESS: NORMAL
BH CV LOWER VASCULAR RIGHT LESSER SAPH COMPRESS: NORMAL
BH CV LOWER VASCULAR RIGHT MID FEMORAL AUGMENT: NORMAL
BH CV LOWER VASCULAR RIGHT MID FEMORAL COMPRESS: NORMAL
BH CV LOWER VASCULAR RIGHT MID FEMORAL PHASIC: NORMAL
BH CV LOWER VASCULAR RIGHT MID FEMORAL SPONT: NORMAL
BH CV LOWER VASCULAR RIGHT PERONEAL COMPRESS: NORMAL
BH CV LOWER VASCULAR RIGHT POPLITEAL AUGMENT: NORMAL
BH CV LOWER VASCULAR RIGHT POPLITEAL COMPRESS: NORMAL
BH CV LOWER VASCULAR RIGHT POPLITEAL PHASIC: NORMAL
BH CV LOWER VASCULAR RIGHT POPLITEAL SPONT: NORMAL
BH CV LOWER VASCULAR RIGHT POSTERIOR TIBIAL COMPRESS: NORMAL
BH CV LOWER VASCULAR RIGHT PROFUNDA FEMORAL AUGMENT: NORMAL
BH CV LOWER VASCULAR RIGHT PROFUNDA FEMORAL COMPRESS: NORMAL
BH CV LOWER VASCULAR RIGHT PROFUNDA FEMORAL PHASIC: NORMAL
BH CV LOWER VASCULAR RIGHT PROFUNDA FEMORAL SPONT: NORMAL
BH CV LOWER VASCULAR RIGHT PROXIMAL FEMORAL AUGMENT: NORMAL
BH CV LOWER VASCULAR RIGHT PROXIMAL FEMORAL COMPRESS: NORMAL
BH CV LOWER VASCULAR RIGHT PROXIMAL FEMORAL PHASIC: NORMAL
BH CV LOWER VASCULAR RIGHT PROXIMAL FEMORAL SPONT: NORMAL
BH CV LOWER VASCULAR RIGHT SAPHENOFEMORAL JUNCTION COMPRESS: NORMAL
BH CV LOWER VASCULAR RIGHT SAPHENOFEMORAL JUNCTION SPONT: NORMAL

## 2019-08-30 PROCEDURE — 93970 EXTREMITY STUDY: CPT

## 2019-08-30 PROCEDURE — 93970 EXTREMITY STUDY: CPT | Performed by: INTERNAL MEDICINE

## 2019-09-05 ENCOUNTER — APPOINTMENT (OUTPATIENT)
Dept: ULTRASOUND IMAGING | Facility: HOSPITAL | Age: 74
End: 2019-09-05

## 2019-09-27 ENCOUNTER — TELEPHONE (OUTPATIENT)
Dept: CARDIOLOGY | Facility: CLINIC | Age: 74
End: 2019-09-27

## 2019-09-27 NOTE — TELEPHONE ENCOUNTER
Patient is having right total knee replacement with Dr. Pelaez. They are requesting pre-operative cardiac risk assessment.     Patient last seen in June 2019 for syncope, hypotension. Patient denies any recurrent events since last visit as well as denies chest pain, palpitations, edema. Next follow up is June 2020      Please advise. Thanks!

## 2020-01-19 NOTE — PROGRESS NOTES
Subjective   Chris Velásquez is a 74 y.o. male.     History of Present Illness     New pt here to establish. H/o:    Syncopal episode in 10/18. Essentially negative workup and was felt to be vasovagal. He has been seeing Dr Flynn, and had 30 day event monitor. No repeat episodes. He tries to avoid watching shows w/ a lot of blood, as this is a trigger. Does tend to have a low BP    Pulmonary nodules - he has a 6mm nodule in the right lower lobe and a 5mm nodule in the right middle lobe. Initial CT was 10/1, then had a 6m f/u in 4/19.he is supposed to have 1 more f/u CT in 4/20 and if this is normal, will not need further    L knee replacement in 10/19, and 6 weeks ago was the right with Dr. Eng.  He has been doing well with rehab     He had labs done in 10/19 before surgery and these were essentially normal.  It looks like he has his physical usually in July.    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 He uses mobic for the pain that he has been on 10+ years. He stopped it before surgery and could tell a difference, so would like to stay on it.  Has tolerated it fine.  He had a very extensive work-up, and eventually saw Dr Domingo who felt it was CMT.     Current Outpatient Medications on File Prior to Visit   Medication Sig Dispense Refill   • cholecalciferol (VITAMIN D3) 1000 units tablet Take 2,000 Units by mouth Daily.     • meloxicam (MOBIC) 15 MG tablet Take 15 mg by mouth Daily.     • Misc Natural Products (OSTEO BI-FLEX JOINT SHIELD PO) Take 1 tablet by mouth Daily.       No current facility-administered medications on file prior to visit.        The following portions of the patient's history were reviewed and updated as appropriate: allergies, current medications, past family history, past medical history, past social history, past surgical history and problem list.    Review of Systems   Constitutional: Positive for activity change and unexpected weight  "gain. Negative for appetite change, fever and unexpected weight loss.   Respiratory: Negative for shortness of breath.    Cardiovascular: Negative for chest pain.   Musculoskeletal: Positive for arthralgias.   Skin: Negative.    Allergic/Immunologic: Negative for immunocompromised state.   Neurological: Positive for numbness (B feet). Negative for seizures, memory problem and confusion.   Psychiatric/Behavioral: Negative for agitation.       Objective   /78 (BP Location: Left arm, Patient Position: Sitting)   Pulse 74   Temp 98.1 °F (36.7 °C) (Temporal)   Ht 190.5 cm (75\")   Wt 107 kg (235 lb)   SpO2 99%   BMI 29.37 kg/m²   Physical Exam   Constitutional: He is oriented to person, place, and time. He appears well-developed and well-nourished. No distress.   HENT:   Head: Normocephalic and atraumatic.   Eyes: Pupils are equal, round, and reactive to light. Conjunctivae and EOM are normal. Right eye exhibits no discharge. Left eye exhibits no discharge.   Neck: Normal range of motion. Neck supple.   Cardiovascular: Normal rate and regular rhythm.   Pulmonary/Chest: Effort normal and breath sounds normal.   Musculoskeletal: He exhibits no edema.   Neurological: He is alert and oriented to person, place, and time. No cranial nerve deficit.   Skin: Skin is warm and dry. No rash noted. He is not diaphoretic.   Psychiatric: He has a normal mood and affect. His behavior is normal. Judgment and thought content normal.   Nursing note and vitals reviewed.        Assessment/Plan   Chris was seen today for establish care, knee replacements and charcot-ashley-tooth disease.    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  He will call when he needs a refill.  We will plan to recheck his labs in July.  No evidence of any kidney or liver disease on his blood work from 2019    Arthritis -patient recovering well from his bilateral knee " replacement    Pulmonary nodules -follow-up CT will be due in 4/20    Orthostatic hypotension -continue to do lifestyle modifications          Prev:  Vaccines UTD  Last colon was about 10 yrs ago - will schedule  Schedule wellness exam

## 2020-01-22 ENCOUNTER — OFFICE VISIT (OUTPATIENT)
Dept: INTERNAL MEDICINE | Facility: CLINIC | Age: 75
End: 2020-01-22

## 2020-01-22 VITALS
HEIGHT: 75 IN | TEMPERATURE: 98.1 F | SYSTOLIC BLOOD PRESSURE: 122 MMHG | BODY MASS INDEX: 29.22 KG/M2 | WEIGHT: 235 LBS | OXYGEN SATURATION: 99 % | DIASTOLIC BLOOD PRESSURE: 78 MMHG | HEART RATE: 74 BPM

## 2020-01-22 DIAGNOSIS — Z12.11 COLON CANCER SCREENING: ICD-10-CM

## 2020-01-22 DIAGNOSIS — I95.1 ORTHOSTATIC HYPOTENSION: ICD-10-CM

## 2020-01-22 DIAGNOSIS — R91.8 PULMONARY NODULES: ICD-10-CM

## 2020-01-22 DIAGNOSIS — G60.0 CMT (CHARCOT-MARIE-TOOTH DISEASE): Primary | ICD-10-CM

## 2020-01-22 DIAGNOSIS — M19.90 ARTHRITIS: ICD-10-CM

## 2020-01-22 PROCEDURE — 99203 OFFICE O/P NEW LOW 30 MIN: CPT | Performed by: INTERNAL MEDICINE

## 2020-01-22 RX ORDER — MELOXICAM 15 MG/1
15 TABLET ORAL DAILY
Qty: 30 TABLET | Refills: 5 | Status: CANCELLED | OUTPATIENT
Start: 2020-01-22

## 2020-02-05 ENCOUNTER — OUTSIDE FACILITY SERVICE (OUTPATIENT)
Dept: GASTROENTEROLOGY | Facility: CLINIC | Age: 75
End: 2020-02-05

## 2020-02-05 PROCEDURE — G0121 COLON CA SCRN NOT HI RSK IND: HCPCS | Performed by: INTERNAL MEDICINE

## 2020-04-07 ENCOUNTER — TELEPHONE (OUTPATIENT)
Dept: CT IMAGING | Facility: HOSPITAL | Age: 75
End: 2020-04-07

## 2020-04-07 DIAGNOSIS — R91.8 PULMONARY NODULES: Primary | ICD-10-CM

## 2020-04-07 NOTE — TELEPHONE ENCOUNTER
Spoke with ervin POLO in office about needing a new order in [facility] before 5/4/20. Left a vm with pt regarding change of day and time for 5/4/20 at 0830. Pt was instructed to call [phone] if he needs to change day or time

## 2020-04-13 ENCOUNTER — HOSPITAL ENCOUNTER (OUTPATIENT)
Dept: CT IMAGING | Facility: HOSPITAL | Age: 75
End: 2020-04-13

## 2020-05-04 ENCOUNTER — APPOINTMENT (OUTPATIENT)
Dept: CT IMAGING | Facility: HOSPITAL | Age: 75
End: 2020-05-04

## 2020-05-13 ENCOUNTER — DOCUMENTATION (OUTPATIENT)
Dept: PULMONOLOGY | Facility: CLINIC | Age: 75
End: 2020-05-13

## 2020-06-15 RX ORDER — MELOXICAM 15 MG/1
15 TABLET ORAL DAILY
Qty: 30 TABLET | Refills: 1 | Status: SHIPPED | OUTPATIENT
Start: 2020-06-15 | End: 2020-07-27 | Stop reason: SDUPTHER

## 2020-06-15 NOTE — TELEPHONE ENCOUNTER
Pt is requesting a refill of meloxicam and have it sent to [facility] in [place].

## 2020-06-26 ENCOUNTER — HOSPITAL ENCOUNTER (OUTPATIENT)
Dept: CT IMAGING | Facility: HOSPITAL | Age: 75
Discharge: HOME | End: 2020-06-26
Admitting: INTERNAL MEDICINE

## 2020-06-26 DIAGNOSIS — R91.8 PULMONARY NODULES: ICD-10-CM

## 2020-06-26 PROCEDURE — 71250 CT THORAX DX C-: CPT

## 2020-07-25 NOTE — PROGRESS NOTES
Subjective   Chris Velásquez is a 74 y.o. male.     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 No side effects w/ the mobic    Pulmonary nodules- he did have a f/u CT scan done last month and there were 2 small <5mm nodules on the right. No enlargement from his previous CT    Knee replacements B - doing well. Left knee occasionally feels some movement/discmfort, but he did see Dr Pelaez and he felt like everything was OK. Pt is biking a lot now and feels well overall    Current Outpatient Medications on File Prior to Visit   Medication Sig Dispense Refill   • aspirin 81 MG tablet Take 81 mg by mouth Daily.     • cholecalciferol (VITAMIN D3) 1000 units tablet Take 2,000 Units by mouth Daily.     • meloxicam (MOBIC) 15 MG tablet Take 1 tablet by mouth Daily. 30 tablet 1   • polyethylene glycol (GoLYTELY) 236 g solution Follow Directions On Paperwork Mailed From Office. If You Have Questions Call [phone]. 4000 mL 0     No current facility-administered medications on file prior to visit.        The following portions of the patient's history were reviewed and updated as appropriate: allergies, current medications, past family history, past medical history, past social history, past surgical history and problem list.    Review of Systems   Constitutional: Positive for unexpected weight loss. Negative for activity change, appetite change, diaphoresis, fatigue, fever and unexpected weight gain.   Respiratory: Negative.    Cardiovascular: Negative.    Musculoskeletal: Positive for arthralgias. Negative for joint swelling.   Skin: Negative.    Allergic/Immunologic: Negative for immunocompromised state.   Neurological: Positive for numbness. Negative for seizures, speech difficulty, light-headedness and memory problem.       Objective   /74 (BP Location: Left arm, Patient Position: Sitting)   Pulse 62   Temp 97.8 °F (36.6 °C)  "(Infrared)   Ht 190.5 cm (75\")   Wt 102 kg (225 lb 12.8 oz)   SpO2 98%   BMI 28.22 kg/m²   Physical Exam   Constitutional: He is oriented to person, place, and time. He appears well-developed and well-nourished. No distress.   HENT:   Head: Normocephalic and atraumatic.   Eyes: Pupils are equal, round, and reactive to light. Conjunctivae and EOM are normal. Right eye exhibits no discharge. Left eye exhibits no discharge.   Neck: Normal range of motion. Neck supple.   Cardiovascular: Normal rate and regular rhythm.   Pulmonary/Chest: Effort normal and breath sounds normal.   Musculoskeletal: He exhibits no edema.   Neurological: He is alert and oriented to person, place, and time. No cranial nerve deficit.   Skin: Skin is warm and dry. No rash noted. He is not diaphoretic.   Psychiatric: He has a normal mood and affect. His behavior is normal. Judgment and thought content normal.   Nursing note and vitals reviewed.        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 Will check labs  -     CBC (No Diff); Future  -     Comprehensive Metabolic Panel; Future    High risk medications (not anticoagulants) long-term use  -     CBC (No Diff); Future  -     Comprehensive Metabolic Panel; Future    Need for hepatitis C screening test  -     Hepatitis C Antibody; Future    Screening cholesterol level-- check today  -     Lipid Panel; Future    Prostate cancer screening- check today  -     PSA Screen; Future    Other orders  -     meloxicam (MOBIC) 15 MG tablet; Take 1 tablet by mouth Daily.      Prev: he had 1st shingrix and will get 2nd next month. Schedule wellness         "

## 2020-07-27 ENCOUNTER — LAB (OUTPATIENT)
Dept: LAB | Facility: HOSPITAL | Age: 75
End: 2020-07-27

## 2020-07-27 ENCOUNTER — OFFICE VISIT (OUTPATIENT)
Dept: INTERNAL MEDICINE | Facility: CLINIC | Age: 75
End: 2020-07-27

## 2020-07-27 VITALS
TEMPERATURE: 97.8 F | SYSTOLIC BLOOD PRESSURE: 104 MMHG | DIASTOLIC BLOOD PRESSURE: 74 MMHG | BODY MASS INDEX: 28.07 KG/M2 | HEIGHT: 75 IN | OXYGEN SATURATION: 98 % | HEART RATE: 62 BPM | WEIGHT: 225.8 LBS

## 2020-07-27 DIAGNOSIS — Z13.220 SCREENING CHOLESTEROL LEVEL: ICD-10-CM

## 2020-07-27 DIAGNOSIS — Z12.5 PROSTATE CANCER SCREENING: ICD-10-CM

## 2020-07-27 DIAGNOSIS — G60.0 CMT (CHARCOT-MARIE-TOOTH DISEASE): Primary | ICD-10-CM

## 2020-07-27 DIAGNOSIS — Z11.59 NEED FOR HEPATITIS C SCREENING TEST: ICD-10-CM

## 2020-07-27 DIAGNOSIS — Z79.899 HIGH RISK MEDICATIONS (NOT ANTICOAGULANTS) LONG-TERM USE: ICD-10-CM

## 2020-07-27 DIAGNOSIS — G60.0 CMT (CHARCOT-MARIE-TOOTH DISEASE): ICD-10-CM

## 2020-07-27 LAB
ALBUMIN SERPL-MCNC: 4.5 G/DL (ref 3.5–5.2)
ALBUMIN/GLOB SERPL: 1.7 G/DL
ALP SERPL-CCNC: 82 U/L (ref 39–117)
ALT SERPL W P-5'-P-CCNC: 17 U/L (ref 1–41)
ANION GAP SERPL CALCULATED.3IONS-SCNC: 8.9 MMOL/L (ref 5–15)
AST SERPL-CCNC: 23 U/L (ref 1–40)
BILIRUB SERPL-MCNC: 0.5 MG/DL (ref 0–1.2)
BUN SERPL-MCNC: 20 MG/DL (ref 8–23)
BUN/CREAT SERPL: 22.5 (ref 7–25)
CALCIUM SPEC-SCNC: 9.9 MG/DL (ref 8.6–10.5)
CHLORIDE SERPL-SCNC: 102 MMOL/L (ref 98–107)
CHOLEST SERPL-MCNC: 183 MG/DL (ref 0–200)
CO2 SERPL-SCNC: 27.1 MMOL/L (ref 22–29)
CREAT SERPL-MCNC: 0.89 MG/DL (ref 0.76–1.27)
DEPRECATED RDW RBC AUTO: 44.5 FL (ref 37–54)
ERYTHROCYTE [DISTWIDTH] IN BLOOD BY AUTOMATED COUNT: 13.1 % (ref 12.3–15.4)
GFR SERPL CREATININE-BSD FRML MDRD: 84 ML/MIN/1.73
GLOBULIN UR ELPH-MCNC: 2.6 GM/DL
GLUCOSE SERPL-MCNC: 87 MG/DL (ref 65–99)
HCT VFR BLD AUTO: 42.3 % (ref 37.5–51)
HCV AB SER DONR QL: NORMAL
HDLC SERPL-MCNC: 54 MG/DL (ref 40–60)
HGB BLD-MCNC: 14.3 G/DL (ref 13–17.7)
LDLC SERPL CALC-MCNC: 114 MG/DL (ref 0–100)
LDLC/HDLC SERPL: 2.12 {RATIO}
MCH RBC QN AUTO: 31 PG (ref 26.6–33)
MCHC RBC AUTO-ENTMCNC: 33.8 G/DL (ref 31.5–35.7)
MCV RBC AUTO: 91.8 FL (ref 79–97)
PLATELET # BLD AUTO: 170 10*3/MM3 (ref 140–450)
PMV BLD AUTO: 11.5 FL (ref 6–12)
POTASSIUM SERPL-SCNC: 4.5 MMOL/L (ref 3.5–5.2)
PROT SERPL-MCNC: 7.1 G/DL (ref 6–8.5)
PSA SERPL-MCNC: 2.09 NG/ML (ref 0–4)
RBC # BLD AUTO: 4.61 10*6/MM3 (ref 4.14–5.8)
SODIUM SERPL-SCNC: 138 MMOL/L (ref 136–145)
TRIGL SERPL-MCNC: 73 MG/DL (ref 0–150)
VLDLC SERPL-MCNC: 14.6 MG/DL (ref 5–40)
WBC # BLD AUTO: 4.17 10*3/MM3 (ref 3.4–10.8)

## 2020-07-27 PROCEDURE — 85027 COMPLETE CBC AUTOMATED: CPT

## 2020-07-27 PROCEDURE — 80061 LIPID PANEL: CPT

## 2020-07-27 PROCEDURE — G0103 PSA SCREENING: HCPCS

## 2020-07-27 PROCEDURE — 99214 OFFICE O/P EST MOD 30 MIN: CPT | Performed by: INTERNAL MEDICINE

## 2020-07-27 PROCEDURE — 86803 HEPATITIS C AB TEST: CPT

## 2020-07-27 PROCEDURE — 80053 COMPREHEN METABOLIC PANEL: CPT

## 2020-07-27 RX ORDER — MELOXICAM 15 MG/1
15 TABLET ORAL DAILY
Qty: 90 TABLET | Refills: 1 | Status: SHIPPED | OUTPATIENT
Start: 2020-07-27 | End: 2021-01-11 | Stop reason: SDUPTHER

## 2020-08-28 ENCOUNTER — APPOINTMENT (OUTPATIENT)
Dept: GENERAL RADIOLOGY | Facility: HOSPITAL | Age: 75
End: 2020-08-28

## 2020-08-28 ENCOUNTER — APPOINTMENT (OUTPATIENT)
Dept: CT IMAGING | Facility: HOSPITAL | Age: 75
End: 2020-08-28

## 2020-08-28 ENCOUNTER — HOSPITAL ENCOUNTER (EMERGENCY)
Facility: HOSPITAL | Age: 75
Discharge: HOME | End: 2020-08-28
Attending: EMERGENCY MEDICINE | Admitting: EMERGENCY MEDICINE

## 2020-08-28 VITALS
HEIGHT: 76 IN | HEART RATE: 60 BPM | OXYGEN SATURATION: 96 % | RESPIRATION RATE: 16 BRPM | TEMPERATURE: 98 F | BODY MASS INDEX: 26.79 KG/M2 | DIASTOLIC BLOOD PRESSURE: 90 MMHG | WEIGHT: 220 LBS | SYSTOLIC BLOOD PRESSURE: 112 MMHG

## 2020-08-28 DIAGNOSIS — S39.012A STRAIN OF LUMBAR REGION, INITIAL ENCOUNTER: ICD-10-CM

## 2020-08-28 DIAGNOSIS — R55 VASOVAGAL SYNCOPE: ICD-10-CM

## 2020-08-28 DIAGNOSIS — T07.XXXA MULTIPLE ABRASIONS: ICD-10-CM
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
## 2020-08-28 DIAGNOSIS — S01.111A EYEBROW LACERATION, RIGHT, INITIAL ENCOUNTER: ICD-10-CM

## 2020-08-28 DIAGNOSIS — S00.83XA FACIAL CONTUSION, INITIAL ENCOUNTER: Primary | ICD-10-CM

## 2020-08-28 LAB
ALBUMIN SERPL-MCNC: 4.2 G/DL (ref 3.5–5.2)
ALBUMIN/GLOB SERPL: 1.8 G/DL
ALP SERPL-CCNC: 85 U/L (ref 39–117)
ALT SERPL W P-5'-P-CCNC: 16 U/L (ref 1–41)
ANION GAP SERPL CALCULATED.3IONS-SCNC: 9 MMOL/L (ref 5–15)
AST SERPL-CCNC: 24 U/L (ref 1–40)
BACTERIA UR QL AUTO: NORMAL /HPF
BASOPHILS # BLD AUTO: 0.04 10*3/MM3 (ref 0–0.2)
BASOPHILS NFR BLD AUTO: 0.5 % (ref 0–1.5)
BILIRUB SERPL-MCNC: 0.6 MG/DL (ref 0–1.2)
BILIRUB UR QL STRIP: NEGATIVE
BUN SERPL-MCNC: 25 MG/DL (ref 8–23)
BUN/CREAT SERPL: 30.5 (ref 7–25)
CALCIUM SPEC-SCNC: 9.5 MG/DL (ref 8.6–10.5)
CHLORIDE SERPL-SCNC: 102 MMOL/L (ref 98–107)
CLARITY UR: CLEAR
CO2 SERPL-SCNC: 26 MMOL/L (ref 22–29)
COLOR UR: YELLOW
CREAT SERPL-MCNC: 0.82 MG/DL (ref 0.76–1.27)
DEPRECATED RDW RBC AUTO: 47.6 FL (ref 37–54)
EOSINOPHIL # BLD AUTO: 0.33 10*3/MM3 (ref 0–0.4)
EOSINOPHIL NFR BLD AUTO: 4.4 % (ref 0.3–6.2)
ERYTHROCYTE [DISTWIDTH] IN BLOOD BY AUTOMATED COUNT: 13.4 % (ref 12.3–15.4)
GFR SERPL CREATININE-BSD FRML MDRD: 92 ML/MIN/1.73
GLOBULIN UR ELPH-MCNC: 2.3 GM/DL
GLUCOSE SERPL-MCNC: 113 MG/DL (ref 65–99)
GLUCOSE UR STRIP-MCNC: NEGATIVE MG/DL
HCT VFR BLD AUTO: 42.4 % (ref 37.5–51)
HGB BLD-MCNC: 13.6 G/DL (ref 13–17.7)
HGB UR QL STRIP.AUTO: NEGATIVE
HOLD SPECIMEN: NORMAL
HOLD SPECIMEN: NORMAL
HYALINE CASTS UR QL AUTO: NORMAL /LPF
IMM GRANULOCYTES # BLD AUTO: 0.02 10*3/MM3 (ref 0–0.05)
IMM GRANULOCYTES NFR BLD AUTO: 0.3 % (ref 0–0.5)
KETONES UR QL STRIP: ABNORMAL
LEUKOCYTE ESTERASE UR QL STRIP.AUTO: ABNORMAL
LYMPHOCYTES # BLD AUTO: 1.04 10*3/MM3 (ref 0.7–3.1)
LYMPHOCYTES NFR BLD AUTO: 13.9 % (ref 19.6–45.3)
MAGNESIUM SERPL-MCNC: 2.1 MG/DL (ref 1.6–2.4)
MCH RBC QN AUTO: 30.6 PG (ref 26.6–33)
MCHC RBC AUTO-ENTMCNC: 32.1 G/DL (ref 31.5–35.7)
MCV RBC AUTO: 95.5 FL (ref 79–97)
MONOCYTES # BLD AUTO: 0.58 10*3/MM3 (ref 0.1–0.9)
MONOCYTES NFR BLD AUTO: 7.8 % (ref 5–12)
NEUTROPHILS NFR BLD AUTO: 5.45 10*3/MM3 (ref 1.7–7)
NEUTROPHILS NFR BLD AUTO: 73.1 % (ref 42.7–76)
NITRITE UR QL STRIP: NEGATIVE
NRBC BLD AUTO-RTO: 0 /100 WBC (ref 0–0.2)
PH UR STRIP.AUTO: 7 [PH] (ref 5–8)
PLATELET # BLD AUTO: 171 10*3/MM3 (ref 140–450)
PMV BLD AUTO: 10.9 FL (ref 6–12)
POTASSIUM SERPL-SCNC: 4.2 MMOL/L (ref 3.5–5.2)
PROT SERPL-MCNC: 6.5 G/DL (ref 6–8.5)
PROT UR QL STRIP: NEGATIVE
RBC # BLD AUTO: 4.44 10*6/MM3 (ref 4.14–5.8)
RBC # UR: NORMAL /HPF
REF LAB TEST METHOD: NORMAL
SODIUM SERPL-SCNC: 137 MMOL/L (ref 136–145)
SP GR UR STRIP: 1.02 (ref 1–1.03)
SQUAMOUS #/AREA URNS HPF: NORMAL /HPF
TROPONIN T SERPL-MCNC: <0.01 NG/ML (ref 0–0.03)
UROBILINOGEN UR QL STRIP: ABNORMAL
WBC # BLD AUTO: 7.46 10*3/MM3 (ref 3.4–10.8)
WBC UR QL AUTO: NORMAL /HPF
WHOLE BLOOD HOLD SPECIMEN: NORMAL
WHOLE BLOOD HOLD SPECIMEN: NORMAL

## 2020-08-28 PROCEDURE — 99285 EMERGENCY DEPT VISIT HI MDM: CPT

## 2020-08-28 PROCEDURE — 84484 ASSAY OF TROPONIN QUANT: CPT | Performed by: EMERGENCY MEDICINE

## 2020-08-28 PROCEDURE — 70450 CT HEAD/BRAIN W/O DYE: CPT

## 2020-08-28 PROCEDURE — 81001 URINALYSIS AUTO W/SCOPE: CPT | Performed by: NURSE PRACTITIONER

## 2020-08-28 PROCEDURE — 80053 COMPREHEN METABOLIC PANEL: CPT | Performed by: EMERGENCY MEDICINE

## 2020-08-28 PROCEDURE — 70486 CT MAXILLOFACIAL W/O DYE: CPT

## 2020-08-28 PROCEDURE — 93005 ELECTROCARDIOGRAM TRACING: CPT

## 2020-08-28 PROCEDURE — 85025 COMPLETE CBC W/AUTO DIFF WBC: CPT

## 2020-08-28 PROCEDURE — 99284 EMERGENCY DEPT VISIT MOD MDM: CPT

## 2020-08-28 PROCEDURE — 72100 X-RAY EXAM L-S SPINE 2/3 VWS: CPT

## 2020-08-28 PROCEDURE — 83735 ASSAY OF MAGNESIUM: CPT | Performed by: EMERGENCY MEDICINE

## 2020-08-28 PROCEDURE — 93005 ELECTROCARDIOGRAM TRACING: CPT | Performed by: EMERGENCY MEDICINE

## 2020-08-28 RX ORDER — SODIUM CHLORIDE 0.9 % (FLUSH) 0.9 %
10 SYRINGE (ML) INJECTION AS NEEDED
Status: DISCONTINUED | OUTPATIENT
Start: 2020-08-28 | End: 2020-08-28 | Stop reason: HOSPADM

## 2020-08-28 RX ORDER — LIDOCAINE HYDROCHLORIDE 10 MG/ML
10 INJECTION, SOLUTION EPIDURAL; INFILTRATION; INTRACAUDAL; PERINEURAL ONCE
Status: DISCONTINUED | OUTPATIENT
Start: 2020-08-28 | End: 2020-08-28 | Stop reason: HOSPADM

## 2020-08-28 RX ADMIN — SODIUM CHLORIDE, PRESERVATIVE FREE 10 ML: 5 INJECTION INTRAVENOUS at 12:47

## 2020-08-28 RX ADMIN — SODIUM CHLORIDE 1000 ML: 9 INJECTION, SOLUTION INTRAVENOUS at 14:51

## 2020-09-04 ENCOUNTER — OFFICE VISIT (OUTPATIENT)
Dept: INTERNAL MEDICINE | Facility: CLINIC | Age: 75
End: 2020-09-04

## 2020-09-04 VITALS
DIASTOLIC BLOOD PRESSURE: 68 MMHG | HEIGHT: 75 IN | TEMPERATURE: 97.5 F | SYSTOLIC BLOOD PRESSURE: 110 MMHG | OXYGEN SATURATION: 99 % | BODY MASS INDEX: 27.68 KG/M2 | HEART RATE: 62 BPM | WEIGHT: 222.6 LBS

## 2020-09-04 DIAGNOSIS — S01.111D LACERATION OF RIGHT EYEBROW, SUBSEQUENT ENCOUNTER: Primary | ICD-10-CM

## 2020-09-04 DIAGNOSIS — Z48.02 VISIT FOR SUTURE REMOVAL: ICD-10-CM

## 2020-09-04 PROCEDURE — 99213 OFFICE O/P EST LOW 20 MIN: CPT | Performed by: NURSE PRACTITIONER
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
## 2021-01-11 ENCOUNTER — OFFICE VISIT (OUTPATIENT)
Dept: INTERNAL MEDICINE | Facility: CLINIC | Age: 76
End: 2021-01-11

## 2021-01-11 ENCOUNTER — LAB (OUTPATIENT)
Dept: LAB | Facility: HOSPITAL | Age: 76
End: 2021-01-11

## 2021-01-11 VITALS
HEART RATE: 63 BPM | DIASTOLIC BLOOD PRESSURE: 72 MMHG | SYSTOLIC BLOOD PRESSURE: 112 MMHG | OXYGEN SATURATION: 98 % | BODY MASS INDEX: 27.53 KG/M2 | TEMPERATURE: 96.9 F | HEIGHT: 75 IN | WEIGHT: 221.4 LBS

## 2021-01-11 DIAGNOSIS — Z79.899 HIGH RISK MEDICATIONS (NOT ANTICOAGULANTS) LONG-TERM USE: ICD-10-CM

## 2021-01-11 DIAGNOSIS — E03.8 SUBCLINICAL HYPOTHYROIDISM: ICD-10-CM

## 2021-01-11 DIAGNOSIS — Z00.00 ENCOUNTER FOR ANNUAL WELLNESS EXAM IN MEDICARE PATIENT: Primary | ICD-10-CM

## 2021-01-11 DIAGNOSIS — R91.8 PULMONARY NODULES: Chronic | ICD-10-CM

## 2021-01-11 DIAGNOSIS — G60.0 CMT (CHARCOT-MARIE-TOOTH DISEASE): Chronic | ICD-10-CM

## 2021-01-11 DIAGNOSIS — I95.1 ORTHOSTATIC HYPOTENSION: ICD-10-CM

## 2021-01-11 LAB
ALBUMIN SERPL-MCNC: 5.1 G/DL (ref 3.5–5.2)
ALBUMIN/GLOB SERPL: 2.2 G/DL
ALP SERPL-CCNC: 96 U/L (ref 39–117)
ALT SERPL W P-5'-P-CCNC: 21 U/L (ref 1–41)
ANION GAP SERPL CALCULATED.3IONS-SCNC: 7 MMOL/L (ref 5–15)
AST SERPL-CCNC: 23 U/L (ref 1–40)
BASOPHILS # BLD AUTO: 0.04 10*3/MM3 (ref 0–0.2)
BASOPHILS NFR BLD AUTO: 0.7 % (ref 0–1.5)
BILIRUB SERPL-MCNC: 0.6 MG/DL (ref 0–1.2)
BUN SERPL-MCNC: 16 MG/DL (ref 8–23)
BUN/CREAT SERPL: 22.2 (ref 7–25)
CALCIUM SPEC-SCNC: 9.6 MG/DL (ref 8.6–10.5)
CHLORIDE SERPL-SCNC: 100 MMOL/L (ref 98–107)
CO2 SERPL-SCNC: 30 MMOL/L (ref 22–29)
CREAT SERPL-MCNC: 0.72 MG/DL (ref 0.76–1.27)
DEPRECATED RDW RBC AUTO: 42.6 FL (ref 37–54)
EOSINOPHIL # BLD AUTO: 0.37 10*3/MM3 (ref 0–0.4)
EOSINOPHIL NFR BLD AUTO: 6.1 % (ref 0.3–6.2)
ERYTHROCYTE [DISTWIDTH] IN BLOOD BY AUTOMATED COUNT: 12.7 % (ref 12.3–15.4)
GFR SERPL CREATININE-BSD FRML MDRD: 107 ML/MIN/1.73
GLOBULIN UR ELPH-MCNC: 2.3 GM/DL
GLUCOSE SERPL-MCNC: 94 MG/DL (ref 65–99)
HCT VFR BLD AUTO: 44.7 % (ref 37.5–51)
HGB BLD-MCNC: 15 G/DL (ref 13–17.7)
IMM GRANULOCYTES # BLD AUTO: 0.01 10*3/MM3 (ref 0–0.05)
IMM GRANULOCYTES NFR BLD AUTO: 0.2 % (ref 0–0.5)
LYMPHOCYTES # BLD AUTO: 1.63 10*3/MM3 (ref 0.7–3.1)
LYMPHOCYTES NFR BLD AUTO: 27 % (ref 19.6–45.3)
MCH RBC QN AUTO: 31 PG (ref 26.6–33)
MCHC RBC AUTO-ENTMCNC: 33.6 G/DL (ref 31.5–35.7)
MCV RBC AUTO: 92.4 FL (ref 79–97)
MONOCYTES # BLD AUTO: 0.59 10*3/MM3 (ref 0.1–0.9)
MONOCYTES NFR BLD AUTO: 9.8 % (ref 5–12)
NEUTROPHILS NFR BLD AUTO: 3.4 10*3/MM3 (ref 1.7–7)
NEUTROPHILS NFR BLD AUTO: 56.2 % (ref 42.7–76)
NRBC BLD AUTO-RTO: 0 /100 WBC (ref 0–0.2)
PLATELET # BLD AUTO: 186 10*3/MM3 (ref 140–450)
PMV BLD AUTO: 11.3 FL (ref 6–12)
POTASSIUM SERPL-SCNC: 4.6 MMOL/L (ref 3.5–5.2)
PROT SERPL-MCNC: 7.4 G/DL (ref 6–8.5)
RBC # BLD AUTO: 4.84 10*6/MM3 (ref 4.14–5.8)
SODIUM SERPL-SCNC: 137 MMOL/L (ref 136–145)
TSH SERPL DL<=0.05 MIU/L-ACNC: 1.92 UIU/ML (ref 0.27–4.2)
WBC # BLD AUTO: 6.04 10*3/MM3 (ref 3.4–10.8)

## 2021-01-11 PROCEDURE — 80053 COMPREHEN METABOLIC PANEL: CPT | Performed by: INTERNAL MEDICINE

## 2021-01-11 PROCEDURE — 85025 COMPLETE CBC W/AUTO DIFF WBC: CPT | Performed by: INTERNAL MEDICINE

## 2021-01-11 PROCEDURE — 36415 COLL VENOUS BLD VENIPUNCTURE: CPT

## 2021-01-11 PROCEDURE — G0439 PPPS, SUBSEQ VISIT: HCPCS | Performed by: INTERNAL MEDICINE

## 2021-01-11 PROCEDURE — 84443 ASSAY THYROID STIM HORMONE: CPT | Performed by: INTERNAL MEDICINE

## 2021-01-11 RX ORDER — MELOXICAM 15 MG/1
15 TABLET ORAL DAILY
Qty: 90 TABLET | Refills: 1 | Status: SHIPPED | OUTPATIENT
Start: 2021-01-11 | End: 2021-12-06 | Stop reason: SDUPTHER

## 2021-07-18 PROBLEM — R91.8 PULMONARY NODULES: Chronic | Status: ACTIVE | Noted: 2018-09-27

## 2021-07-19 ENCOUNTER — OFFICE VISIT (OUTPATIENT)
Dept: INTERNAL MEDICINE | Facility: CLINIC | Age: 76
End: 2021-07-19

## 2021-07-19 ENCOUNTER — LAB (OUTPATIENT)
Dept: LAB | Facility: HOSPITAL | Age: 76
End: 2021-07-19

## 2021-07-19 VITALS
DIASTOLIC BLOOD PRESSURE: 70 MMHG | BODY MASS INDEX: 27.85 KG/M2 | OXYGEN SATURATION: 95 % | TEMPERATURE: 97.3 F | WEIGHT: 224 LBS | SYSTOLIC BLOOD PRESSURE: 108 MMHG | HEIGHT: 75 IN | HEART RATE: 55 BPM

## 2021-07-19 DIAGNOSIS — Z79.899 HIGH RISK MEDICATIONS (NOT ANTICOAGULANTS) LONG-TERM USE: ICD-10-CM

## 2021-07-19 DIAGNOSIS — H93.13 TINNITUS OF BOTH EARS: Chronic | ICD-10-CM

## 2021-07-19 DIAGNOSIS — G60.0 CHARCOT-MARIE-TOOTH DISEASE: Primary | ICD-10-CM

## 2021-07-19 LAB
DEPRECATED RDW RBC AUTO: 45 FL (ref 37–54)
ERYTHROCYTE [DISTWIDTH] IN BLOOD BY AUTOMATED COUNT: 13.2 % (ref 12.3–15.4)
HCT VFR BLD AUTO: 44.8 % (ref 37.5–51)
HGB BLD-MCNC: 15.3 G/DL (ref 13–17.7)
MCH RBC QN AUTO: 31.9 PG (ref 26.6–33)
MCHC RBC AUTO-ENTMCNC: 34.2 G/DL (ref 31.5–35.7)
MCV RBC AUTO: 93.3 FL (ref 79–97)
PLATELET # BLD AUTO: 184 10*3/MM3 (ref 140–450)
PMV BLD AUTO: 11.6 FL (ref 6–12)
RBC # BLD AUTO: 4.8 10*6/MM3 (ref 4.14–5.8)
WBC # BLD AUTO: 4.13 10*3/MM3 (ref 3.4–10.8)

## 2021-07-19 PROCEDURE — 80053 COMPREHEN METABOLIC PANEL: CPT | Performed by: INTERNAL MEDICINE

## 2021-07-19 PROCEDURE — 99213 OFFICE O/P EST LOW 20 MIN: CPT | Performed by: INTERNAL MEDICINE

## 2021-07-19 PROCEDURE — 85027 COMPLETE CBC AUTOMATED: CPT | Performed by: INTERNAL MEDICINE

## 2021-07-19 NOTE — PROGRESS NOTES
"Chief Complaint  Follow-up (blood work)    Subjective          Chris Velásquez presents to [facility]  History of Present Illness    CMT- on daily mobic and this does help with the discomfort.  He is staying very active with biking and also just returned from [place] and walked and hiked some there.      Tinnitus - has had for several years, and some hearing loss- had testing done through work in the past and he would like to go ahead and get hearing checked    Orthostatic hypotension-he has been wearing compression [id number] and has not had any recurrence of syncope, and his lower extremity edema is better as well              Current Outpatient Medications:   •  cholecalciferol (VITAMIN D3) 1000 units tablet, Take 2,000 Units by mouth Daily., Disp: , Rfl:   •  meloxicam (MOBIC) 15 MG tablet, Take 1 tablet by mouth Daily., Disp: 90 tablet, Rfl: 1      Objective   Vital Signs:   /70 (BP Location: Right arm, Patient Position: Sitting, Cuff Size: Adult)   Pulse 55   Temp 97.3 °F (36.3 °C) (Infrared)   Ht 190.5 cm (75\")   Wt 102 kg (224 lb)   SpO2 95%   BMI 28.00 kg/m²       Physical exam  Constitutional: oriented to person, place, and time.  well-developed and well-nourished. No distress.   HENT:   Head: Normocephalic and atraumatic.   Ears-small amount of cerumen bilaterally  Eyes: Conjunctivae and EOM are normal.   Cardiovascular: Normal rate, regular rhythm and normal heart sounds.  Exam reveals no gallop and no friction rub.    No murmur heard.  Pulmonary/Chest: Effort normal and breath sounds normal. No respiratory distress.   no wheezes.   Neurological:  alert and oriented to person, place, and time.   Skin: Skin is warm and dry. not diaphoretic.   Psychiatric:  normal mood and affect. behavior is normal. Judgment and thought content normal.      Result Review :   The following data was reviewed by: Nargis Rosenthal MD on 07/19/2021:  CMP    CMP 7/27/20 " 8/28/20 1/11/21   Glucose 87 113 (A) 94   BUN 20 25 (A) 16   Creatinine 0.89 0.82 0.72 (A)   eGFR Non African Am 84 92 107   Sodium 138 137 137   Potassium 4.5 4.2 4.6   Chloride 102 102 100   Calcium 9.9 9.5 9.6   Albumin 4.50 4.20 5.10   Total Bilirubin 0.5 0.6 0.6   Alkaline Phosphatase 82 85 96   AST (SGOT) 23 24 23   ALT (SGPT) 17 16 21   (A) Abnormal value            CBC    CBC 7/27/20 8/28/20 1/11/21   WBC 4.17 7.46 6.04   RBC 4.61 4.44 4.84   Hemoglobin 14.3 13.6 15.0   Hematocrit 42.3 42.4 44.7   MCV 91.8 95.5 92.4   MCH 31.0 30.6 31.0   MCHC 33.8 32.1 33.6   RDW 13.1 13.4 12.7   Platelets 170 171 186           TSH    TSH 1/11/21   TSH 1.920                        Assessment and Plan    Diagnoses and all orders for this visit:    1. Charcot-Ashley-Tooth disease (Primary)  Comments:  Stable symptoms.  Patient uses meloxicam daily.  Check blood work today    2. High risk medications (not anticoagulants) long-term use  -     CBC (No Diff); Future  -     Comprehensive Metabolic Panel; Future    3. Tinnitus of both ears  Comments:  And hearing loss-I gave him Kierra Rucker's card        Follow Up   Return in about 6 months (around 1/19/2022) for Medicare Wellness.  Patient was given instructions and counseling regarding his condition or for health maintenance advice. Please see specific information pulled into the AVS if appropriate.

## 2021-07-20 LAB
ALBUMIN SERPL-MCNC: 4.4 G/DL (ref 3.5–5.2)
ALBUMIN/GLOB SERPL: 1.8 G/DL
ALP SERPL-CCNC: 96 U/L (ref 39–117)
ALT SERPL W P-5'-P-CCNC: 16 U/L (ref 1–41)
ANION GAP SERPL CALCULATED.3IONS-SCNC: 6.2 MMOL/L (ref 5–15)
AST SERPL-CCNC: 19 U/L (ref 1–40)
BILIRUB SERPL-MCNC: 0.6 MG/DL (ref 0–1.2)
BUN SERPL-MCNC: 16 MG/DL (ref 8–23)
BUN/CREAT SERPL: 19.3 (ref 7–25)
CALCIUM SPEC-SCNC: 9.3 MG/DL (ref 8.6–10.5)
CHLORIDE SERPL-SCNC: 102 MMOL/L (ref 98–107)
CO2 SERPL-SCNC: 29.8 MMOL/L (ref 22–29)
CREAT SERPL-MCNC: 0.83 MG/DL (ref 0.76–1.27)
GFR SERPL CREATININE-BSD FRML MDRD: 91 ML/MIN/1.73
GLOBULIN UR ELPH-MCNC: 2.5 GM/DL
GLUCOSE SERPL-MCNC: 84 MG/DL (ref 65–99)
POTASSIUM SERPL-SCNC: 4.3 MMOL/L (ref 3.5–5.2)
PROT SERPL-MCNC: 6.9 G/DL (ref 6–8.5)
SODIUM SERPL-SCNC: 138 MMOL/L (ref 136–145)

## 2021-12-06 RX ORDER — MELOXICAM 15 MG/1
15 TABLET ORAL DAILY
Qty: 90 TABLET | Refills: 1 | Status: SHIPPED | OUTPATIENT
Start: 2021-12-06 | End: 2022-07-29 | Stop reason: SDUPTHER

## 2022-02-28 ENCOUNTER — TELEPHONE (OUTPATIENT)
Dept: INTERNAL MEDICINE | Facility: CLINIC | Age: 77
End: 2022-02-28

## 2022-03-01 DIAGNOSIS — M53.3 SI (SACROILIAC) PAIN: ICD-10-CM

## 2022-03-01 DIAGNOSIS — M51.36 DDD (DEGENERATIVE DISC DISEASE), LUMBAR: Primary | ICD-10-CM

## 2022-03-02 NOTE — TELEPHONE ENCOUNTER
Caller: Chris Velásquez    Relationship: Self    Best call back [phone]    What was the call regarding:   PATIENT WOULD LIKE A CALL BACK REGARDING THE STATUS OF HIS REFERRAL TO [facility]     Do you require a callback: YES

## 2022-04-04 ENCOUNTER — TELEPHONE (OUTPATIENT)
Dept: INTERNAL MEDICINE | Facility: CLINIC | Age: 77
End: 2022-04-04

## 2022-07-29 ENCOUNTER — OFFICE VISIT (OUTPATIENT)
Dept: INTERNAL MEDICINE | Facility: CLINIC | Age: 77
End: 2022-07-29

## 2022-07-29 ENCOUNTER — LAB (OUTPATIENT)
Dept: LAB | Facility: HOSPITAL | Age: 77
End: 2022-07-29

## 2022-07-29 VITALS
SYSTOLIC BLOOD PRESSURE: 104 MMHG | HEIGHT: 75 IN | DIASTOLIC BLOOD PRESSURE: 68 MMHG | WEIGHT: 225.2 LBS | BODY MASS INDEX: 28 KG/M2 | OXYGEN SATURATION: 98 % | HEART RATE: 61 BPM | TEMPERATURE: 96.6 F

## 2022-07-29 DIAGNOSIS — Z79.899 HIGH RISK MEDICATIONS (NOT ANTICOAGULANTS) LONG-TERM USE: ICD-10-CM

## 2022-07-29 DIAGNOSIS — Z12.5 PROSTATE CANCER SCREENING: ICD-10-CM

## 2022-07-29 DIAGNOSIS — E78.00 PURE HYPERCHOLESTEROLEMIA: ICD-10-CM

## 2022-07-29 DIAGNOSIS — G60.0 CHARCOT-MARIE-TOOTH DISEASE: ICD-10-CM

## 2022-07-29 DIAGNOSIS — Z00.00 MEDICARE ANNUAL WELLNESS VISIT, SUBSEQUENT: Primary | ICD-10-CM

## 2022-07-29 DIAGNOSIS — R55 VASOVAGAL SYNCOPE: ICD-10-CM

## 2022-07-29 LAB
ALBUMIN SERPL-MCNC: 4.3 G/DL (ref 3.5–5.2)
ALBUMIN/GLOB SERPL: 1.9 G/DL
ALP SERPL-CCNC: 84 U/L (ref 39–117)
ALT SERPL W P-5'-P-CCNC: 13 U/L (ref 1–41)
ANION GAP SERPL CALCULATED.3IONS-SCNC: 9 MMOL/L (ref 5–15)
AST SERPL-CCNC: 17 U/L (ref 1–40)
BILIRUB SERPL-MCNC: 0.6 MG/DL (ref 0–1.2)
BUN SERPL-MCNC: 16 MG/DL (ref 8–23)
BUN/CREAT SERPL: 22.2 (ref 7–25)
CALCIUM SPEC-SCNC: 9.3 MG/DL (ref 8.6–10.5)
CHLORIDE SERPL-SCNC: 104 MMOL/L (ref 98–107)
CHOLEST SERPL-MCNC: 180 MG/DL (ref 0–200)
CO2 SERPL-SCNC: 26 MMOL/L (ref 22–29)
CREAT SERPL-MCNC: 0.72 MG/DL (ref 0.76–1.27)
DEPRECATED RDW RBC AUTO: 41.7 FL (ref 37–54)
EGFRCR SERPLBLD CKD-EPI 2021: 95.9 ML/MIN/1.73
ERYTHROCYTE [DISTWIDTH] IN BLOOD BY AUTOMATED COUNT: 12.5 % (ref 12.3–15.4)
GLOBULIN UR ELPH-MCNC: 2.3 GM/DL
GLUCOSE SERPL-MCNC: 91 MG/DL (ref 65–99)
HCT VFR BLD AUTO: 43.2 % (ref 37.5–51)
HDLC SERPL-MCNC: 53 MG/DL (ref 40–60)
HGB BLD-MCNC: 14.7 G/DL (ref 13–17.7)
LDLC SERPL CALC-MCNC: 116 MG/DL (ref 0–100)
LDLC/HDLC SERPL: 2.18 {RATIO}
MCH RBC QN AUTO: 31.5 PG (ref 26.6–33)
MCHC RBC AUTO-ENTMCNC: 34 G/DL (ref 31.5–35.7)
MCV RBC AUTO: 92.5 FL (ref 79–97)
PLATELET # BLD AUTO: 190 10*3/MM3 (ref 140–450)
PMV BLD AUTO: 11.1 FL (ref 6–12)
POTASSIUM SERPL-SCNC: 4.3 MMOL/L (ref 3.5–5.2)
PROT SERPL-MCNC: 6.6 G/DL (ref 6–8.5)
PSA SERPL-MCNC: 2.43 NG/ML (ref 0–4)
RBC # BLD AUTO: 4.67 10*6/MM3 (ref 4.14–5.8)
SODIUM SERPL-SCNC: 139 MMOL/L (ref 136–145)
TRIGL SERPL-MCNC: 58 MG/DL (ref 0–150)
TSH SERPL DL<=0.05 MIU/L-ACNC: 1.66 UIU/ML (ref 0.27–4.2)
VLDLC SERPL-MCNC: 11 MG/DL (ref 5–40)
WBC NRBC COR # BLD: 5.13 10*3/MM3 (ref 3.4–10.8)

## 2022-07-29 PROCEDURE — 80061 LIPID PANEL: CPT | Performed by: INTERNAL MEDICINE

## 2022-07-29 PROCEDURE — 1126F AMNT PAIN NOTED NONE PRSNT: CPT | Performed by: INTERNAL MEDICINE

## 2022-07-29 PROCEDURE — 1160F RVW MEDS BY RX/DR IN RCRD: CPT | Performed by: INTERNAL MEDICINE

## 2022-07-29 PROCEDURE — 84443 ASSAY THYROID STIM HORMONE: CPT | Performed by: INTERNAL MEDICINE

## 2022-07-29 PROCEDURE — 85027 COMPLETE CBC AUTOMATED: CPT | Performed by: INTERNAL MEDICINE

## 2022-07-29 PROCEDURE — G0439 PPPS, SUBSEQ VISIT: HCPCS | Performed by: INTERNAL MEDICINE

## 2022-07-29 PROCEDURE — 1170F FXNL STATUS ASSESSED: CPT | Performed by: INTERNAL MEDICINE

## 2022-07-29 PROCEDURE — 80053 COMPREHEN METABOLIC PANEL: CPT | Performed by: INTERNAL MEDICINE

## 2022-07-29 PROCEDURE — G0103 PSA SCREENING: HCPCS | Performed by: INTERNAL MEDICINE

## 2022-07-29 RX ORDER — MELOXICAM 15 MG/1
15 TABLET ORAL DAILY
Qty: 90 TABLET | Refills: 3 | Status: SHIPPED | OUTPATIENT
Start: 2022-07-29

## 2022-07-29 NOTE — PROGRESS NOTES
History of Present Illness     Here for wellness exam. Overall feels well, and no hospitalizations in last year.  He feels physical and mental health are the same as last year    PHQ-2:0  Fall-none  Memory:  Just short-term problems-has to write down what he needs to do or who will forget but no trouble otherwise   pain level:  0  Living will: Patient has  Specialists:  GI-Donna  Cardiology-Rina  Pulmonary-Case  ortho - Chayo Pelaez  Exercise: Biking regularly. Stationary bike daily in winter, and then several days a week in the other seasons.  does have some weights but has not done  Diet: healthy overall- veggies daily  Is on a vitamin D supplement.  1 diet Pepsi daily  ETOH 10/week     CMT- he is on mobic daily and this helps with the neuropathic pain. No side effects w/ the mobic.    During Car trips, he notices more burning pain on side of left foot especially, but overall he feels like he is doing well.  He held mobic for about a month and pain restarted so did have to restart.    Orthostatic hypotension-he has been wearing compression stockings 15-20mmHg. He wears them daily and this has helped w/ swelling as well as the orthostasis.  No further syncopal episodes    Pulmonary nodules- he did have a f/u CT scan done in 6/20 and there were 2 small <5mm nodules on the right. No enlargement from his previous CT. he is a non-smoker    Low back pain-he has been doing PT for SI joint pain change and this did help     Knee replacements B - doing well.   A little decrease in flexibility but biking seems to help    Tinnitus and hearing loss. He does have a card for audiology but doesn't quite feel like he needs to see them yet    Current Outpatient Medications on File Prior to Visit   Medication Sig Dispense Refill   • cholecalciferol (VITAMIN D3) 1000 units tablet Take 2,000 Units by mouth Daily.     • [DISCONTINUED] meloxicam (MOBIC) 15 MG tablet Take 1 tablet by mouth Daily. 90 tablet 1     No current  facility-administered medications on file prior to visit.       The following portions of the patient's history were reviewed and updated as appropriate: allergies, current medications, past family history, past medical history, past social history, past surgical history and problem list.    Review of Systems   Constitutional: Negative for activity change, appetite change, fever, unexpected weight gain and unexpected weight loss.   HENT: Positive for hearing loss and tinnitus.    Eyes: Negative.    Respiratory: Negative for shortness of breath and wheezing.    Cardiovascular: Negative for chest pain, palpitations and leg swelling.   Gastrointestinal: Negative.    Endocrine: Negative.    Genitourinary: Positive for nocturia (0-1/night). Negative for difficulty urinating (slightly slower) and dysuria.   Musculoskeletal: Positive for arthralgias. Negative for back pain (improved w/ PT).   Skin: Negative.    Allergic/Immunologic: Negative for immunocompromised state.   Neurological: Negative for seizures, speech difficulty, memory problem and confusion.   Hematological: Does not bruise/bleed easily.   Psychiatric/Behavioral: Negative for agitation.         Objective    Vitals:    07/29/22 1032   BP: 104/68   Pulse: 61   Temp: 96.6 °F (35.9 °C)   SpO2: 98%     Physical Exam   Physical Exam  Vitals and nursing note reviewed.   Constitutional:       General: He is not in acute distress.     Appearance: He is well-developed. He is not diaphoretic.   HENT:      Head: Normocephalic and atraumatic.      Right Ear: External ear normal.      Left Ear: External ear normal.      Nose: Nose normal.      Mouth/Throat:      Pharynx: No oropharyngeal exudate.   Eyes:      General: No scleral icterus.        Right eye: No discharge.         Left eye: No discharge.      Conjunctiva/sclera: Conjunctivae normal.      Pupils: Pupils are equal, round, and reactive to light.   Neck:      Thyroid: No thyromegaly.   Cardiovascular:      Rate  and Rhythm: Normal rate and regular rhythm.      Heart sounds: Normal heart sounds. No murmur heard.  Pulmonary:      Effort: Pulmonary effort is normal. No respiratory distress.      Breath sounds: Normal breath sounds. No stridor. No wheezing or rales.   Abdominal:      General: Bowel sounds are normal. There is no distension.      Palpations: Abdomen is soft. There is no mass.      Tenderness: There is no abdominal tenderness. There is no guarding or rebound.   Musculoskeletal:         General: No deformity. Normal range of motion.      Cervical back: Normal range of motion and neck supple.   Lymphadenopathy:      Cervical: No cervical adenopathy.   Skin:     General: Skin is warm and dry.      Coloration: Skin is not pale.      Findings: No erythema or rash.   Neurological:      Mental Status: He is alert and oriented to person, place, and time.      Coordination: Coordination normal.      Deep Tendon Reflexes: Reflexes normal.   Psychiatric:         Behavior: Behavior normal.         Thought Content: Thought content normal.         Judgment: Judgment normal.             Assessment/Plan   Diagnoses and all orders for this visit:    1. Medicare annual wellness visit, subsequent (Primary)  Regular exercise, healthy diet. Sunscreen use encouraged  Living will - he has and is on file here  Colonoscopy -due in 2/30  prevnar -had  Pneumovax -had  Shingles-he had Shingrix  Hep A-he had  Tetanus -due in '24  COVID vaccines- he had  Prostate screening -PSA today     2. Charcot-Ashley-Tooth disease-stable symptoms, uses Mobic daily  -     meloxicam (MOBIC) 15 MG tablet; Take 1 tablet by mouth Daily.  Dispense: 90 tablet; Refill: 3    3. Vasovagal syncope-no recurrence.  Using compression stockings daily    4. Prostate cancer screening  -     PSA Screen; Future    5.. High risk medications (not anticoagulants) long-term use-Mobic  -     CBC (No Diff); Future  -     Comprehensive Metabolic Panel; Future

## 2022-10-13 ENCOUNTER — PATIENT MESSAGE (OUTPATIENT)
Dept: INTERNAL MEDICINE | Facility: CLINIC | Age: 77
End: 2022-10-13

## 2022-10-13 NOTE — TELEPHONE ENCOUNTER
From: Chris Clayton  To: Nargis Rosenthal MD  Sent: 10/13/2022 12:05 PM EDT  Subject: Flu vaccine    Received the vaccine today, 10/13/22. Please add this to my immunization record.   Thank you,   Baldomero Clayton

## 2023-01-10 DIAGNOSIS — H91.93 BILATERAL HEARING LOSS, UNSPECIFIED HEARING LOSS TYPE: Primary | ICD-10-CM

## 2023-04-02 ENCOUNTER — TELEMEDICINE (OUTPATIENT)
Dept: FAMILY MEDICINE CLINIC | Facility: TELEHEALTH | Age: 78
End: 2023-04-02
Payer: MEDICARE

## 2023-04-02 DIAGNOSIS — U07.1 COVID-19: Primary | ICD-10-CM

## 2023-04-02 PROCEDURE — 99213 OFFICE O/P EST LOW 20 MIN: CPT | Performed by: NURSE PRACTITIONER

## 2023-04-02 RX ORDER — GUAIFENESIN AND DEXTROMETHORPHAN HYDROBROMIDE 600; 30 MG/1; MG/1
1 TABLET, EXTENDED RELEASE ORAL 2 TIMES DAILY PRN
Qty: 20 TABLET | Refills: 0 | Status: SHIPPED | OUTPATIENT
Start: 2023-04-02

## 2023-04-02 RX ORDER — PSEUDOEPHEDRINE HCL 120 MG/1
120 TABLET, FILM COATED, EXTENDED RELEASE ORAL EVERY 12 HOURS
Qty: 20 TABLET | Refills: 0 | Status: SHIPPED | OUTPATIENT
Start: 2023-04-02

## 2023-04-02 RX ORDER — BENZONATATE 200 MG/1
200 CAPSULE ORAL 3 TIMES DAILY PRN
Qty: 30 CAPSULE | Refills: 0 | Status: SHIPPED | OUTPATIENT
Start: 2023-04-02

## 2023-04-02 NOTE — PROGRESS NOTES
CHIEF COMPLAINT  Chief Complaint   Patient presents with   • Cough   • Headache         HPI  Chris Velásquez is a 77 y.o. male  presents with complaint of test positive for Covid 19 today with symptoms present for 3 days. Wife has covid 19. He is requesting Paxlovid. No fever, body aches, headache, cough and chills.     Review of Systems   Constitutional: Positive for activity change, chills and fatigue. Negative for fever.   HENT: Positive for sore throat.    Respiratory: Positive for cough. Negative for shortness of breath, wheezing and stridor.    Cardiovascular: Negative.    Gastrointestinal: Negative.    Neurological: Positive for headaches.   Hematological: Negative.    Psychiatric/Behavioral: Negative.        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, industrial environment   • Hypotension    • Lung nodule    • Neuropathy     feet   • Vasovagal syncope        Family History   Problem Relation Age of Onset   • Alcohol abuse Mother         alcoholic   • Liver disease Mother         alcoholic   • Migraines Mother         stopped after hysterectomy   • Cervical cancer Mother    • Cancer Mother         Uterine   • Alcohol abuse Father         problem from breathing alcohol at work, very light drinker   • Liver disease Father         from breathing alcohol at work   • Heart attack Father         primary cause believed to be liver failure   • Arthritis Father    • Cirrhosis Maternal Grandmother    • Alcohol abuse Maternal Grandmother         Alcoholic   • Liver disease Maternal Grandmother          from it   • Alcohol abuse Maternal Grandfather         heavy drinker   • No Known Problems Paternal Grandmother    • No Known Problems Paternal Grandfather        Social History     Socioeconomic History   • Marital status:     Tobacco Use   • Smoking status: Never   • Smokeless tobacco: Never   Vaping Use   • Vaping Use: Never used   Substance and Sexual Activity   • Alcohol use: Yes     Alcohol/week: 10.0 standard drinks     Types: 10 Glasses of wine per week     Comment: 2 to 3 glasses, 3 to 4 days a week   • Drug use: No   • Sexual activity: Not Currently     Partners: Female     Birth control/protection: None         There were no vitals taken for this visit.    PHYSICAL EXAM  Physical Exam   Constitutional: He is oriented to person, place, and time. He appears well-developed and well-nourished. He does not have a sickly appearance. He does not appear ill. No distress.   HENT:   Head: Normocephalic and atraumatic.   Pulmonary/Chest: Effort normal.  No respiratory distress.  Neurological: He is alert and oriented to person, place, and time.   Psychiatric: He has a normal mood and affect.   Vitals reviewed.      Results for orders placed or performed in visit on 07/29/22   PSA Screen    Specimen: Blood   Result Value Ref Range    PSA 2.430 0.000 - 4.000 ng/mL   CBC (No Diff)    Specimen: Blood   Result Value Ref Range    WBC 5.13 3.40 - 10.80 10*3/mm3    RBC 4.67 4.14 - 5.80 10*6/mm3    Hemoglobin 14.7 13.0 - 17.7 g/dL    Hematocrit 43.2 37.5 - 51.0 %    MCV 92.5 79.0 - 97.0 fL    MCH 31.5 26.6 - 33.0 pg    MCHC 34.0 31.5 - 35.7 g/dL    RDW 12.5 12.3 - 15.4 %    RDW-SD 41.7 37.0 - 54.0 fl    MPV 11.1 6.0 - 12.0 fL    Platelets 190 140 - 450 10*3/mm3   Comprehensive Metabolic Panel    Specimen: Blood   Result Value Ref Range    Glucose 91 65 - 99 mg/dL    BUN 16 8 - 23 mg/dL    Creatinine 0.72 (L) 0.76 - 1.27 mg/dL    Sodium 139 136 - 145 mmol/L    Potassium 4.3 3.5 - 5.2 mmol/L    Chloride 104 98 - 107 mmol/L    CO2 26.0 22.0 - 29.0 mmol/L    Calcium 9.3 8.6 - 10.5 mg/dL    Total Protein 6.6 6.0 - 8.5 g/dL    Albumin 4.30 3.50 - 5.20 g/dL    ALT (SGPT) 13 1 - 41 U/L    AST (SGOT) 17 1 - 40 U/L    Alkaline Phosphatase 84 39 - 117 U/L     Total Bilirubin 0.6 0.0 - 1.2 mg/dL    Globulin 2.3 gm/dL    A/G Ratio 1.9 g/dL    BUN/Creatinine Ratio 22.2 7.0 - 25.0    Anion Gap 9.0 5.0 - 15.0 mmol/L    eGFR 95.9 >60.0 mL/min/1.73   Lipid Panel    Specimen: Blood   Result Value Ref Range    Total Cholesterol 180 0 - 200 mg/dL    Triglycerides 58 0 - 150 mg/dL    HDL Cholesterol 53 40 - 60 mg/dL    LDL Cholesterol  116 (H) 0 - 100 mg/dL    VLDL Cholesterol 11 5 - 40 mg/dL    LDL/HDL Ratio 2.18    TSH    Specimen: Blood   Result Value Ref Range    TSH 1.660 0.270 - 4.200 uIU/mL       Diagnoses and all orders for this visit:    1. COVID-19 (Primary)  -     benzonatate (TESSALON) 200 MG capsule; Take 1 capsule by mouth 3 (Three) Times a Day As Needed for Cough.  Dispense: 30 capsule; Refill: 0  -     pseudoephedrine (Sudafed 12 Hour) 120 MG 12 hr tablet; Take 1 tablet by mouth Every 12 (Twelve) Hours.  Dispense: 20 tablet; Refill: 0  -     guaifenesin-dextromethorphan (MUCINEX DM)  MG tablet sustained-release 12 hour tablet; Take 1 tablet by mouth 2 (Two) Times a Day As Needed (cough and congestion).  Dispense: 20 tablet; Refill: 0  -     Nirmatrelvir&Ritonavir 300/100 (PAXLOVID) 20 x 150 MG & 10 x 100MG tablet therapy pack tablet; Take 3 tablets by mouth 2 (Two) Times a Day for 5 days.  Dispense: 30 tablet; Refill: 0    permission to prescribe Paxlovid obtained after discussion on side effects. FDA and EUA facts.  Recommend rest and increased fluids.   Fact sheet emailed.   Advised to follow up at ED for worsening s/s.   Quarantine as directed.       The use of a video visit has been reviewed with the patient and verbal informd consent has een obtained. Myself and Chris Velásquez participated in this visit. The patient is located in [address] [place] [address]. I am located in [place], [place]. Mychart and Zoom were utilized. I spent  15 minutes in the patient's chart for this visit.           Joseline Sprague,  APRN  04/02/2023  10:03 EDT

## 2023-04-02 NOTE — PATIENT INSTRUCTIONS
10 Things You Can Do to Manage Your COVID-19 Symptoms at Home  If you have possible or confirmed COVID-19  Stay home except to get medical care.  Monitor your symptoms carefully. If your symptoms get worse, call your healthcare provider immediately.  Get rest and stay hydrated.  If you have a medical appointment, call the healthcare provider ahead of time and tell them that you have or may have COVID-19.  For medical emergencies, call 911 and notify the dispatch personnel that you have or may have COVID-19.  Cover your cough and sneezes with a tissue or use the inside of your elbow.  Wash your hands often with soap and water for at least 20 seconds or clean your hands with an alcohol-based hand  that contains at least 60% alcohol.  As much as possible, stay in a specific room and away from other people in your home. Also, you should use a separate bathroom, if available. If you need to be around other people in or outside of the home, wear a mask.  Avoid sharing personal items with other people in your household, like dishes, towels, and bedding.  Clean all surfaces that are touched often, like counters, tabletops, and doorknobs. Use household cleaning sprays or wipes according to the label instructions.  cdc.gov/coronavirus  07/16/2021  This information is not intended to replace advice given to you by your health care provider. Make sure you discuss any questions you have with your health care provider.  Document Revised: 11/02/2022 Document Reviewed: 11/02/2022  [employer] Patient Education © 2022 [employer]

## 2023-04-03 ENCOUNTER — OFFICE VISIT (OUTPATIENT)
Dept: INTERNAL MEDICINE | Facility: CLINIC | Age: 78
End: 2023-04-03
Payer: MEDICARE

## 2023-04-03 VITALS
TEMPERATURE: 97.1 F | RESPIRATION RATE: 20 BRPM | HEART RATE: 70 BPM | OXYGEN SATURATION: 96 % | SYSTOLIC BLOOD PRESSURE: 138 MMHG | DIASTOLIC BLOOD PRESSURE: 80 MMHG

## 2023-04-03 DIAGNOSIS — U07.1 COVID-19: Primary | ICD-10-CM

## 2023-04-03 DIAGNOSIS — J02.9 SORE THROAT: ICD-10-CM

## 2023-04-03 LAB
EXPIRATION DATE: NORMAL
INTERNAL CONTROL: NORMAL
Lab: NORMAL
S PYO AG THROAT QL: NEGATIVE

## 2023-04-03 PROCEDURE — 99213 OFFICE O/P EST LOW 20 MIN: CPT | Performed by: STUDENT IN AN ORGANIZED HEALTH CARE EDUCATION/TRAINING PROGRAM

## 2023-04-03 PROCEDURE — 87880 STREP A ASSAY W/OPTIC: CPT | Performed by: STUDENT IN AN ORGANIZED HEALTH CARE EDUCATION/TRAINING PROGRAM

## 2023-04-03 NOTE — PROGRESS NOTES
Office Note     Name: Chris Velásquez    : 1945     MRN: [id number]     Chief Complaint  covid positive (Pt having severe sore throat and wanted to be tested for strep just in case. Per Dr. Rosenthal wanted him too be swabbed for strep today)    Subjective     History of Present Illness:  Chris Velásquez is a 77 y.o. male who presents today for       Tested positive for Covid on 2023 via home test. Was having symptoms of URI for the past 3 days. He is concerned due to the sore throat which is a symptoms his wife doesn't have. No fever, no shortness of breath, no wheezing, no cough, no chills.  Had a [facility] care visit and was prescribed Paxlovid, sudafed, mucinex DM and cough medicine.       Review of Systems:   Review of Systems   All other systems reviewed and are negative.      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 Downing   • JOINT REPLACEMENT  2019    knee replacement (2)   • REPLACEMENT TOTAL KNEE Left 10/11/2019    charissa   • REPLACEMENT TOTAL KNEE Right 12/10/2019    stuart   • ROTATOR CUFF REPAIR Right 10/01/2014    Bijan   • SHOULDER SURGERY Right 2015    surgery after rotator cuff b/c something tore loose       Family History:   Family History   Problem Relation Age of Onset   • Alcohol abuse Mother         alcoholic   • Liver disease Mother         alcoholic   • Migraines Mother         stopped after hysterectomy   • Cervical cancer Mother    •  Cancer Mother         Uterine   • Alcohol abuse Father         problem from breathing alcohol at work, very light drinker   • Liver disease Father         from breathing alcohol at work   • Heart attack Father         primary cause believed to be liver failure   • Arthritis Father    • Cirrhosis Maternal Grandmother    • Alcohol abuse Maternal Grandmother         Alcoholic   • Liver disease Maternal Grandmother          from it   • Alcohol abuse Maternal Grandfather         heavy drinker   • No Known Problems Paternal Grandmother    • No Known Problems Paternal Grandfather        Social History:   Social History     Socioeconomic History   • Marital status:    Tobacco Use   • Smoking status: Never   • Smokeless tobacco: Never   Vaping Use   • Vaping Use: Never used   Substance and Sexual Activity   • Alcohol use: Yes     Alcohol/week: 10.0 standard drinks     Types: 10 Glasses of wine per week     Comment: 2 to 3 glasses, 3 to 4 days a week   • Drug use: No   • Sexual activity: Not Currently     Partners: Female     Birth control/protection: None       Immunizations:   Immunization History   Administered Date(s) Administered   • COVID-19 (PFIZER) BIVALENT BOOSTER 12+YRS 2022   • COVID-19 (PFIZER) PURPLE CAP 2021, 2021, 10/11/2021   • Covid-19 (Pfizer) Gray Cap 2022   • Fluzone High Dose =>65 Years ([employer] ONLY) 2019   • Fluzone High-Dose 65+yrs 2021, 10/16/2022   • Hepatitis A 2018, 06/10/2019   • Influenza, Unspecified 2020, 10/13/2022   • Pneumococcal Conjugate 13-Valent (PCV13) 2016   • Pneumococcal Polysaccharide (PPSV23) 06/10/2019   • Shingrix 2020, 2020   • Tdap 04/10/2014   • Zoster, Unspecified 2016        Medications:     Current Outpatient Medications:   •  benzonatate (TESSALON) 200 MG capsule, Take 1 capsule by mouth 3 (Three) Times a Day As Needed for Cough., Disp: 30 capsule, Rfl: 0  •  cholecalciferol (VITAMIN D3)  "1000 units tablet, Take 2 tablets by mouth Daily., Disp: , Rfl:   •  guaifenesin-dextromethorphan (MUCINEX DM)  MG tablet sustained-release 12 hour tablet, Take 1 tablet by mouth 2 (Two) Times a Day As Needed (cough and congestion)., Disp: 20 tablet, Rfl: 0  •  meloxicam (MOBIC) 15 MG tablet, Take 1 tablet by mouth Daily., Disp: 90 tablet, Rfl: 3  •  Nirmatrelvir&Ritonavir 300/100 (PAXLOVID) 20 x 150 MG & 10 x 100MG tablet therapy pack tablet, Take 3 tablets by mouth 2 (Two) Times a Day for 5 days., Disp: 30 tablet, Rfl: 0  •  pseudoephedrine (Sudafed 12 Hour) 120 MG 12 hr tablet, Take 1 tablet by mouth Every 12 (Twelve) Hours., Disp: 20 tablet, Rfl: 0    Allergies:   Allergies   Allergen Reactions   • Penicillins Hives       Objective     Vital Signs  /80 (Cuff Size: Adult)   Pulse 70   Temp 97.1 °F (36.2 °C) (Infrared)   Resp 20   SpO2 96%   Estimated body mass index is 27.5 kg/m² as calculated from the following:    Height as of 1/23/23: 190.5 cm (75\").    Weight as of 1/23/23: 99.8 kg (220 lb).          Physical Exam  Constitutional:       General: He is not in acute distress.     Appearance: Normal appearance. He is not ill-appearing or toxic-appearing.   HENT:      Mouth/Throat:      Mouth: Mucous membranes are moist.      Pharynx: Posterior oropharyngeal erythema present. No oropharyngeal exudate.   Cardiovascular:      Rate and Rhythm: Normal rate and regular rhythm.      Pulses: Normal pulses.      Heart sounds: Normal heart sounds.   Pulmonary:      Effort: Pulmonary effort is normal.      Breath sounds: Normal breath sounds.   Neurological:      Mental Status: He is alert.          Result Review :                POCT rapid strep A  Order: [id number]   Status: Final result      Visible to patient: No (scheduled for 4/3/2023  4:41 PM)      Next appt: 08/04/2023 at 08:15 AM in Internal Medicine (Nargis Rosenthal MD)      Dx: Sore throat     Specimen Information: Swab    0 Result Notes    "   Component  Ref Range & Units 15:40   Rapid Strep A Screen  Negative, VALID, INVALID, Not Performed Negative    Internal Control  Passed Passed    Lot Number [id number]    Expiration Date 10/31/2024    Merged with [facility]          Assessment and Plan     1. COVID-19  Continue with supportive care for symptoms.   ED/REturn to clinic for worsening symptoms    2. Sore throat  Negative for strep throat.    - POCT rapid strep A       Follow Up  No follow-ups on file.    Iraj Funez MD  [facility] [address]  [facility]  [address]  [address] [address] [phone]

## 2023-07-25 RX ORDER — MELOXICAM 15 MG/1
15 TABLET ORAL DAILY
Qty: 30 TABLET | Refills: 0 | Status: SHIPPED | OUTPATIENT
Start: 2023-07-25

## 2023-07-25 NOTE — TELEPHONE ENCOUNTER
LV: 7/29/22 w/ Dr. Rosenthal, 4/3/23 w/Dr. Funez for acute  NV: 8/4/23  LF: 7/29/22 90/3  LL: 7/29/22

## 2023-08-04 ENCOUNTER — OFFICE VISIT (OUTPATIENT)
Dept: INTERNAL MEDICINE | Facility: CLINIC | Age: 78
End: 2023-08-04
Payer: MEDICARE

## 2023-08-04 ENCOUNTER — LAB (OUTPATIENT)
Dept: INTERNAL MEDICINE | Facility: CLINIC | Age: 78
End: 2023-08-04
Payer: MEDICARE

## 2023-08-04 VITALS
BODY MASS INDEX: 27.73 KG/M2 | OXYGEN SATURATION: 98 % | TEMPERATURE: 97.3 F | HEART RATE: 60 BPM | SYSTOLIC BLOOD PRESSURE: 108 MMHG | DIASTOLIC BLOOD PRESSURE: 60 MMHG | WEIGHT: 223 LBS | HEIGHT: 75 IN

## 2023-08-04 DIAGNOSIS — I95.1 ORTHOSTATIC HYPOTENSION: ICD-10-CM

## 2023-08-04 DIAGNOSIS — E78.00 PURE HYPERCHOLESTEROLEMIA: ICD-10-CM

## 2023-08-04 DIAGNOSIS — R55 VASOVAGAL SYNCOPE: ICD-10-CM

## 2023-08-04 DIAGNOSIS — G60.0 CHARCOT-MARIE-TOOTH DISEASE: ICD-10-CM

## 2023-08-04 DIAGNOSIS — Z12.5 PROSTATE CANCER SCREENING: ICD-10-CM

## 2023-08-04 DIAGNOSIS — Z00.00 MEDICARE ANNUAL WELLNESS VISIT, SUBSEQUENT: Primary | ICD-10-CM

## 2023-08-04 LAB
ALBUMIN SERPL-MCNC: 4.4 G/DL (ref 3.5–5.2)
ALBUMIN/GLOB SERPL: 1.8 G/DL
ALP SERPL-CCNC: 74 U/L (ref 39–117)
ALT SERPL W P-5'-P-CCNC: 13 U/L (ref 1–41)
ANION GAP SERPL CALCULATED.3IONS-SCNC: 9.2 MMOL/L (ref 5–15)
AST SERPL-CCNC: 20 U/L (ref 1–40)
BILIRUB SERPL-MCNC: 0.6 MG/DL (ref 0–1.2)
BUN SERPL-MCNC: 17 MG/DL (ref 8–23)
BUN/CREAT SERPL: 19.8 (ref 7–25)
CALCIUM SPEC-SCNC: 9.7 MG/DL (ref 8.6–10.5)
CHLORIDE SERPL-SCNC: 105 MMOL/L (ref 98–107)
CHOLEST SERPL-MCNC: 190 MG/DL (ref 0–200)
CO2 SERPL-SCNC: 26.8 MMOL/L (ref 22–29)
CREAT SERPL-MCNC: 0.86 MG/DL (ref 0.76–1.27)
DEPRECATED RDW RBC AUTO: 42.1 FL (ref 37–54)
EGFRCR SERPLBLD CKD-EPI 2021: 90.3 ML/MIN/1.73
ERYTHROCYTE [DISTWIDTH] IN BLOOD BY AUTOMATED COUNT: 12.3 % (ref 12.3–15.4)
GLOBULIN UR ELPH-MCNC: 2.4 GM/DL
GLUCOSE SERPL-MCNC: 92 MG/DL (ref 65–99)
HCT VFR BLD AUTO: 44.2 % (ref 37.5–51)
HDLC SERPL-MCNC: 55 MG/DL (ref 40–60)
HGB BLD-MCNC: 14.8 G/DL (ref 13–17.7)
LDLC SERPL CALC-MCNC: 123 MG/DL (ref 0–100)
LDLC/HDLC SERPL: 2.21 {RATIO}
MCH RBC QN AUTO: 31.5 PG (ref 26.6–33)
MCHC RBC AUTO-ENTMCNC: 33.5 G/DL (ref 31.5–35.7)
MCV RBC AUTO: 94 FL (ref 79–97)
PLATELET # BLD AUTO: 175 10*3/MM3 (ref 140–450)
PMV BLD AUTO: 11.5 FL (ref 6–12)
POTASSIUM SERPL-SCNC: 4.6 MMOL/L (ref 3.5–5.2)
PROT SERPL-MCNC: 6.8 G/DL (ref 6–8.5)
PSA SERPL-MCNC: 2.44 NG/ML (ref 0–4)
RBC # BLD AUTO: 4.7 10*6/MM3 (ref 4.14–5.8)
SODIUM SERPL-SCNC: 141 MMOL/L (ref 136–145)
TRIGL SERPL-MCNC: 67 MG/DL (ref 0–150)
TSH SERPL DL<=0.05 MIU/L-ACNC: 1.97 UIU/ML (ref 0.27–4.2)
VLDLC SERPL-MCNC: 12 MG/DL (ref 5–40)
WBC NRBC COR # BLD: 5.49 10*3/MM3 (ref 3.4–10.8)

## 2023-08-04 PROCEDURE — 85027 COMPLETE CBC AUTOMATED: CPT | Performed by: INTERNAL MEDICINE

## 2023-08-04 PROCEDURE — 84443 ASSAY THYROID STIM HORMONE: CPT | Performed by: INTERNAL MEDICINE

## 2023-08-04 PROCEDURE — 80053 COMPREHEN METABOLIC PANEL: CPT | Performed by: INTERNAL MEDICINE

## 2023-08-04 PROCEDURE — 1159F MED LIST DOCD IN RCRD: CPT | Performed by: INTERNAL MEDICINE

## 2023-08-04 PROCEDURE — 1170F FXNL STATUS ASSESSED: CPT | Performed by: INTERNAL MEDICINE

## 2023-08-04 PROCEDURE — G0103 PSA SCREENING: HCPCS | Performed by: INTERNAL MEDICINE

## 2023-08-04 PROCEDURE — 1160F RVW MEDS BY RX/DR IN RCRD: CPT | Performed by: INTERNAL MEDICINE

## 2023-08-04 PROCEDURE — 36415 COLL VENOUS BLD VENIPUNCTURE: CPT | Performed by: INTERNAL MEDICINE

## 2023-08-04 PROCEDURE — G0439 PPPS, SUBSEQ VISIT: HCPCS | Performed by: INTERNAL MEDICINE

## 2023-08-04 PROCEDURE — 80061 LIPID PANEL: CPT | Performed by: INTERNAL MEDICINE

## 2023-08-06 RX ORDER — MELOXICAM 15 MG/1
15 TABLET ORAL DAILY
Qty: 90 TABLET | Refills: 3 | Status: SHIPPED | OUTPATIENT
Start: 2023-08-06

## 2023-08-25 RX ORDER — MELOXICAM 15 MG/1
15 TABLET ORAL DAILY
Qty: 90 TABLET | Refills: 3 | Status: CANCELLED | OUTPATIENT
Start: 2023-08-25

## 2023-08-25 NOTE — TELEPHONE ENCOUNTER
LV: 8/4/23  NV: 8/14/24  LF: 8/6/23 90/3    Sent patient a my chart message letting him know to contact the pharmacy for this refill.

## 2024-08-13 NOTE — PROGRESS NOTES
Subjective   The ABCs of the Annual Wellness Visit  Medicare Wellness Visit      Chris Velásquez is a 78 y.o. patient who presents for a Medicare Wellness Visit.    The following portions of the patient's history were reviewed and   updated as appropriate: allergies, current medications, past family history, past medical history, past social history, past surgical history, and problem list.    Compared to one year ago, the patient's physical   health is the same.  Compared to one year ago, the patient's mental   health is the same.    Recent Hospitalizations:  He was not admitted to the hospital during the last year.     Current Medical Providers:  Patient Care Team:  Nargis Rosenthal MD as PCP - General (Internal Medicine)  Jakob Downing MD as Consulting Physician (Gastroenterology)  Edmundo Pelaez MD as Consulting Physician (Orthopedic Surgery)  [facility] (Audiology)    Outpatient Medications Prior to Visit   Medication Sig Dispense Refill    cholecalciferol (VITAMIN D3) 1000 units tablet Take 2 tablets by mouth Daily.      meloxicam (MOBIC) 15 MG tablet Take 1 tablet by mouth Daily. 90 tablet 3     No facility-administered medications prior to visit.     No opioid medication identified on active medication list. I have reviewed chart for other potential  high risk medication/s and harmful drug interactions in the elderly.      Aspirin is not on active medication list.  Aspirin use is not indicated based on review of current medical condition/s. Risk of harm outweighs potential benefits.  .    Patient Active Problem List   Diagnosis    Syncope    Vitamin D deficiency    Orthostatic hypotension    Arthritis    Pulmonary nodules    Charcot-Ashley-Tooth disease    Vasovagal syncope     Advance Care Planning Advance Directive is on file.  ACP discussion was held with the patient during this visit. Patient has an advance directive in EMR which is still valid.    "          Objective   Vitals:    24 0819   BP: 104/64   BP Location: Right arm   Patient Position: Sitting   Cuff Size: Adult   Pulse: 66   Resp: 14   Temp: 96.9 °F (36.1 °C)   TempSrc: Infrared   SpO2: 96%   Weight: 100 kg (221 lb 3.2 oz)   Height: 189.3 cm (74.53\")       Estimated body mass index is 28 kg/m² as calculated from the following:    Height as of this encounter: 189.3 cm (74.53\").    Weight as of this encounter: 100 kg (221 lb 3.2 oz).    BMI is >= 25 and <30. (Overweight) The following options were offered after discussion;: BMI close to normal, has healthy lifestyle       Does the patient have evidence of cognitive impairment? No                                                                                                Health  Risk Assessment    Smoking Status:  Social History     Tobacco Use   Smoking Status Never   Smokeless Tobacco Never     Alcohol Consumption:  Social History     Substance and Sexual Activity   Alcohol Use Yes    Alcohol/week: 10.0 standard drinks of alcohol    Types: 10 Glasses of wine per week    Comment: 2 glasses, 5 days a week       Fall Risk Screen  STEADI Fall Risk Assessment was completed, and patient is at LOW risk for falls.Assessment completed on:2024    Depression Screenin/14/2024     8:14 AM   PHQ-2/PHQ-9 Depression Screening   Little Interest or Pleasure in Doing Things 0-->not at all   Feeling Down, Depressed or Hopeless 0-->not at all   PHQ-9: Brief Depression Severity Measure Score 0     Health Habits and Functional and Cognitive Screenin/14/2024     8:12 AM   Functional & Cognitive Status   Do you have difficulty preparing food and eating? No   Do you have difficulty bathing yourself, getting dressed or grooming yourself? No   Do you have difficulty using the toilet? No   Do you have difficulty moving around from place to place? No   Do you have trouble with steps or getting out of a bed or a chair? No   Current Diet Well " Balanced Diet   Dental Exam Up to date   Eye Exam Not up to date   Exercise (times per week) 3 times per week   Current Exercises Include Bicycling Outdoors   Do you need help using the phone?  No   Are you deaf or do you have serious difficulty hearing?  No   Do you need help to go to places out of walking distance? No   Do you need help shopping? No   Do you need help preparing meals?  No   Do you need help with housework?  No   Do you need help with laundry? No   Do you need help taking your medications? No   Do you need help managing money? No   Do you ever drive or ride in a car without wearing a seat belt? No   Have you felt unusual stress, anger or loneliness in the last month? No   Who do you live with? Spouse   If you need help, do you have trouble finding someone available to you? No   Have you been bothered in the last four weeks by sexual problems? No   Do you have difficulty concentrating, remembering or making decisions? No           Age-appropriate Screening Schedule:  Refer to the list below for future screening recommendations based on patient's age, sex and/or medical conditions. Orders for these recommended tests are listed in the plan section. The patient has been provided with a written plan.    Health Maintenance List  Health Maintenance   Topic Date Due    BMI FOLLOWUP  07/29/2023    COVID-19 Vaccine (7 - 2023-24 season) 11/19/2023    TDAP/TD VACCINES (2 - Td or Tdap) 04/10/2024    ANNUAL WELLNESS VISIT  08/04/2024    LIPID PANEL  08/04/2024    INFLUENZA VACCINE  08/01/2024    COLORECTAL CANCER SCREENING  02/05/2030    HEPATITIS C SCREENING  Completed    RSV Vaccine - Adults  Completed    Pneumococcal Vaccine 65+  Completed    ZOSTER VACCINE  Completed                                                                                                                                                CMS Preventative Services Quick Reference  Risk Factors Identified During Encounter  None  Identified    The above risks/problems have been discussed with the patient.  Pertinent information has been shared with the patient in the After Visit Summary.  An After Visit Summary and PPPS were made available to the patient.    Follow Up:   Next Medicare Wellness visit to be scheduled in 1 year.         Additional E&M Note during same encounter follows:  Patient has additional, significant, and separately identifiable condition(s)/problem(s) that require work above and beyond the Medicare Wellness Visit     Chief Complaint  Medicare Wellness-subsequent    Subjective   HPI  Chris is also being seen today for additional medical problem/s.  CMT- he is on mobic fairly regularly though goes off for a week or 2 at a time until pain gets worse; and this helps with the neuropathic pain. No side effects w/ the mobic.  balance is an issue, some burning but doesn't usually keep him up at night     Orthostatic hypotension-he has been wearing compression stockings 15-20mmHg. He wears them daily and also drinks plenty.  No further syncopal episodes and no lightheadedness     Tinnitus and hearing loss.has hearing aids     Skin lesion on tip of nose for a few months, and some other spots on his face that are scaly that have been there for a few years. 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 Stationary bike daily in winter, and then several days a week in the other seasons.  does have some weights but has not done  Diet: healthy overall- veggies/fruits daily  Is on a vitamin D supplement.  1 diet Pepsi daily.   ETOH 6-10 glasses of wine /week      Review of Systems   Constitutional:  Negative for activity change, appetite change, fatigue and fever.   Respiratory:  Negative for shortness of breath.    Cardiovascular:  Negative for chest pain and leg swelling.   Genitourinary:         Slow urination; 1x/night nocturia   Allergic/Immunologic: Negative for immunocompromised state.  "  Neurological:  Negative for seizures, syncope, speech difficulty, weakness, light-headedness and confusion.              Objective   Vital Signs:  /64 (BP Location: Right arm, Patient Position: Sitting, Cuff Size: Adult)   Pulse 66   Temp 96.9 °F (36.1 °C) (Infrared)   Resp 14   Ht 189.3 cm (74.53\")   Wt 100 kg (221 lb 3.2 oz)   SpO2 96%   BMI 28.00 kg/m²   Physical Exam  Vitals and nursing note reviewed.   Constitutional:       General: He is not in acute distress.     Appearance: He is well-developed. He is not diaphoretic.   HENT:      Head: Normocephalic and atraumatic.      Right Ear: External ear normal.      Left Ear: External ear normal.      Nose: Nose normal.      Mouth/Throat:      Pharynx: No oropharyngeal exudate.   Eyes:      General: No scleral icterus.        Right eye: No discharge.         Left eye: No discharge.      Conjunctiva/sclera: Conjunctivae normal.      Pupils: Pupils are equal, round, and reactive to light.   Neck:      Thyroid: No thyromegaly.   Cardiovascular:      Rate and Rhythm: Normal rate and regular rhythm.      Heart sounds: Normal heart sounds. No murmur heard.  Pulmonary:      Effort: Pulmonary effort is normal. No respiratory distress.      Breath sounds: Normal breath sounds. No stridor. No wheezing or rales.   Abdominal:      General: Bowel sounds are normal. There is no distension.      Palpations: Abdomen is soft. There is no mass.      Tenderness: There is no abdominal tenderness. There is no guarding or rebound.   Musculoskeletal:         General: No deformity. Normal range of motion.      Cervical back: Normal range of motion and neck supple.   Lymphadenopathy:      Cervical: No cervical adenopathy.   Skin:     General: Skin is warm and dry.      Coloration: Skin is not pale.      Findings: No erythema or rash.   Neurological:      Mental Status: He is alert and oriented to person, place, and time.      Coordination: Coordination normal.      Deep Tendon " Reflexes: Reflexes normal.   Psychiatric:         Behavior: Behavior normal.         Thought Content: Thought content normal.         Judgment: Judgment normal.                 Assessment and Plan                 Encounter for annual wellness exam in Medicare patient  Regular exercise/healthy dietSunscreen use encouraged.   Living will - he has and is on file here  Colonoscopy -due in 2/30  prevnar -had  Pneumovax -had  Shingles-he had Shingrix  Hep A-he had  Tetanus -due in '24- get at pharmacy  RSV - pt had  Prostate screening -PSA today  Orthostatic hypotension  No syncopal episodes recently; uses compression stockings  Charcot-Ashley-Tooth disease  Stable on the mobic, not using daily  Prostate cancer screening  Check PSA  Pure hypercholesterolemia    Check today, LDL mildly above normal    Orders Placed This Encounter   Procedures    PSA Screen     Standing Status:   Future     Order Specific Question:   Release to patient     Answer:   Routine Release [[id number]]    CBC (No Diff)     Standing Status:   Future     Order Specific Question:   Release to patient     Answer:   Routine Release [[id number]]    Comprehensive Metabolic Panel     Standing Status:   Future     Order Specific Question:   Release to patient     Answer:   Routine Release [[id number]]    Lipid Panel     Standing Status:   Future     Order Specific Question:   Release to patient     Answer:   Routine Release [[id number]]    TSH     Standing Status:   Future     Order Specific Question:   Release to patient     Answer:   Routine Release [[id number]]             Follow Up   No follow-ups on file.  Patient was given instructions and counseling regarding his condition or for health maintenance advice. Please see specific information pulled into the AVS if appropriate.

## 2024-08-14 ENCOUNTER — LAB (OUTPATIENT)
Dept: INTERNAL MEDICINE | Facility: CLINIC | Age: 79
End: 2024-08-14
Payer: MEDICARE

## 2024-08-14 ENCOUNTER — PATIENT MESSAGE (OUTPATIENT)
Dept: INTERNAL MEDICINE | Facility: CLINIC | Age: 79
End: 2024-08-14

## 2024-08-14 ENCOUNTER — OFFICE VISIT (OUTPATIENT)
Dept: INTERNAL MEDICINE | Facility: CLINIC | Age: 79
End: 2024-08-14
Payer: MEDICARE

## 2024-08-14 VITALS
BODY MASS INDEX: 27.5 KG/M2 | TEMPERATURE: 96.9 F | RESPIRATION RATE: 14 BRPM | WEIGHT: 221.2 LBS | SYSTOLIC BLOOD PRESSURE: 104 MMHG | HEIGHT: 75 IN | DIASTOLIC BLOOD PRESSURE: 64 MMHG | HEART RATE: 66 BPM | OXYGEN SATURATION: 96 %

## 2024-08-14 DIAGNOSIS — Z00.00 ENCOUNTER FOR ANNUAL WELLNESS EXAM IN MEDICARE PATIENT: Primary | ICD-10-CM

## 2024-08-14 DIAGNOSIS — I95.1 ORTHOSTATIC HYPOTENSION: ICD-10-CM

## 2024-08-14 DIAGNOSIS — Z12.5 PROSTATE CANCER SCREENING: ICD-10-CM

## 2024-08-14 DIAGNOSIS — E78.00 PURE HYPERCHOLESTEROLEMIA: ICD-10-CM

## 2024-08-14 DIAGNOSIS — G60.0 CHARCOT-MARIE-TOOTH DISEASE: ICD-10-CM

## 2024-08-14 LAB
ALBUMIN SERPL-MCNC: 4.3 G/DL (ref 3.5–5.2)
ALBUMIN/GLOB SERPL: 1.8 G/DL
ALP SERPL-CCNC: 81 U/L (ref 39–117)
ALT SERPL W P-5'-P-CCNC: 16 U/L (ref 1–41)
ANION GAP SERPL CALCULATED.3IONS-SCNC: 10.6 MMOL/L (ref 5–15)
AST SERPL-CCNC: 24 U/L (ref 1–40)
BILIRUB SERPL-MCNC: 0.8 MG/DL (ref 0–1.2)
BUN SERPL-MCNC: 20 MG/DL (ref 8–23)
BUN/CREAT SERPL: 23.3 (ref 7–25)
CALCIUM SPEC-SCNC: 9.4 MG/DL (ref 8.6–10.5)
CHLORIDE SERPL-SCNC: 101 MMOL/L (ref 98–107)
CHOLEST SERPL-MCNC: 183 MG/DL (ref 0–200)
CO2 SERPL-SCNC: 26.4 MMOL/L (ref 22–29)
CREAT SERPL-MCNC: 0.86 MG/DL (ref 0.76–1.27)
DEPRECATED RDW RBC AUTO: 45.9 FL (ref 37–54)
EGFRCR SERPLBLD CKD-EPI 2021: 89.7 ML/MIN/1.73
ERYTHROCYTE [DISTWIDTH] IN BLOOD BY AUTOMATED COUNT: 12.7 % (ref 12.3–15.4)
GLOBULIN UR ELPH-MCNC: 2.4 GM/DL
GLUCOSE SERPL-MCNC: 86 MG/DL (ref 65–99)
HCT VFR BLD AUTO: 44.1 % (ref 37.5–51)
HDLC SERPL-MCNC: 53 MG/DL (ref 40–60)
HGB BLD-MCNC: 14.6 G/DL (ref 13–17.7)
LDLC SERPL CALC-MCNC: 116 MG/DL (ref 0–100)
LDLC/HDLC SERPL: 2.17 {RATIO}
MCH RBC QN AUTO: 32 PG (ref 26.6–33)
MCHC RBC AUTO-ENTMCNC: 33.1 G/DL (ref 31.5–35.7)
MCV RBC AUTO: 96.7 FL (ref 79–97)
PLATELET # BLD AUTO: 165 10*3/MM3 (ref 140–450)
PMV BLD AUTO: 11.5 FL (ref 6–12)
POTASSIUM SERPL-SCNC: 4.1 MMOL/L (ref 3.5–5.2)
PROT SERPL-MCNC: 6.7 G/DL (ref 6–8.5)
PSA SERPL-MCNC: 2.58 NG/ML (ref 0–4)
RBC # BLD AUTO: 4.56 10*6/MM3 (ref 4.14–5.8)
SODIUM SERPL-SCNC: 138 MMOL/L (ref 136–145)
TRIGL SERPL-MCNC: 76 MG/DL (ref 0–150)
TSH SERPL DL<=0.05 MIU/L-ACNC: 2.21 UIU/ML (ref 0.27–4.2)
VLDLC SERPL-MCNC: 14 MG/DL (ref 5–40)
WBC NRBC COR # BLD AUTO: 5.54 10*3/MM3 (ref 3.4–10.8)

## 2024-08-14 PROCEDURE — G0103 PSA SCREENING: HCPCS | Performed by: INTERNAL MEDICINE

## 2024-08-14 PROCEDURE — 1159F MED LIST DOCD IN RCRD: CPT | Performed by: INTERNAL MEDICINE

## 2024-08-14 PROCEDURE — G0439 PPPS, SUBSEQ VISIT: HCPCS | Performed by: INTERNAL MEDICINE

## 2024-08-14 PROCEDURE — 1160F RVW MEDS BY RX/DR IN RCRD: CPT | Performed by: INTERNAL MEDICINE

## 2024-08-14 PROCEDURE — 1125F AMNT PAIN NOTED PAIN PRSNT: CPT | Performed by: INTERNAL MEDICINE

## 2024-08-14 PROCEDURE — 1170F FXNL STATUS ASSESSED: CPT | Performed by: INTERNAL MEDICINE

## 2024-08-14 PROCEDURE — 36415 COLL VENOUS BLD VENIPUNCTURE: CPT | Performed by: INTERNAL MEDICINE

## 2024-08-14 PROCEDURE — 84443 ASSAY THYROID STIM HORMONE: CPT | Performed by: INTERNAL MEDICINE

## 2024-08-14 PROCEDURE — 80053 COMPREHEN METABOLIC PANEL: CPT | Performed by: INTERNAL MEDICINE

## 2024-08-14 PROCEDURE — 85027 COMPLETE CBC AUTOMATED: CPT | Performed by: INTERNAL MEDICINE

## 2024-08-14 PROCEDURE — 80061 LIPID PANEL: CPT | Performed by: INTERNAL MEDICINE

## 2024-08-14 NOTE — TELEPHONE ENCOUNTER
From: Chris Velásquez  To: Nargis Rosenthal  Sent: 8/14/2024 2:32 PM EDT  Subject: TDAP Vaccine    I got the BOOSTRIX TDAP VACCINE today.

## 2024-10-20 ENCOUNTER — HOSPITAL ENCOUNTER (EMERGENCY)
Facility: HOSPITAL | Age: 79
Discharge: HOME | End: 2024-10-20
Attending: EMERGENCY MEDICINE | Admitting: EMERGENCY MEDICINE
Payer: MEDICARE

## 2024-10-20 ENCOUNTER — APPOINTMENT (OUTPATIENT)
Dept: GENERAL RADIOLOGY | Facility: HOSPITAL | Age: 79
End: 2024-10-20
Payer: MEDICARE

## 2024-10-20 VITALS
OXYGEN SATURATION: 96 % | TEMPERATURE: 98.2 F | DIASTOLIC BLOOD PRESSURE: 81 MMHG | RESPIRATION RATE: 18 BRPM | BODY MASS INDEX: 27.35 KG/M2 | HEART RATE: 64 BPM | WEIGHT: 220 LBS | SYSTOLIC BLOOD PRESSURE: 138 MMHG | HEIGHT: 75 IN

## 2024-10-20 DIAGNOSIS — M94.0 COSTOCHONDRITIS: ICD-10-CM

## 2024-10-20 DIAGNOSIS — R07.89 CHEST WALL PAIN: Primary | ICD-10-CM

## 2024-10-20 LAB
ALBUMIN SERPL-MCNC: 3.9 G/DL (ref 3.5–5.2)
ALBUMIN/GLOB SERPL: 1.3 G/DL
ALP SERPL-CCNC: 108 U/L (ref 39–117)
ALT SERPL W P-5'-P-CCNC: 14 U/L (ref 1–41)
ANION GAP SERPL CALCULATED.3IONS-SCNC: 8 MMOL/L (ref 5–15)
AST SERPL-CCNC: 29 U/L (ref 1–40)
BASOPHILS # BLD AUTO: 0.04 10*3/MM3 (ref 0–0.2)
BASOPHILS NFR BLD AUTO: 0.5 % (ref 0–1.5)
BILIRUB SERPL-MCNC: 0.5 MG/DL (ref 0–1.2)
BUN SERPL-MCNC: 21 MG/DL (ref 8–23)
BUN/CREAT SERPL: 26.3 (ref 7–25)
CALCIUM SPEC-SCNC: 9 MG/DL (ref 8.6–10.5)
CHLORIDE SERPL-SCNC: 102 MMOL/L (ref 98–107)
CO2 SERPL-SCNC: 27 MMOL/L (ref 22–29)
CREAT SERPL-MCNC: 0.8 MG/DL (ref 0.76–1.27)
DEPRECATED RDW RBC AUTO: 46.3 FL (ref 37–54)
EGFRCR SERPLBLD CKD-EPI 2021: 91.7 ML/MIN/1.73
EOSINOPHIL # BLD AUTO: 0.37 10*3/MM3 (ref 0–0.4)
EOSINOPHIL NFR BLD AUTO: 4.3 % (ref 0.3–6.2)
ERYTHROCYTE [DISTWIDTH] IN BLOOD BY AUTOMATED COUNT: 13 % (ref 12.3–15.4)
GLOBULIN UR ELPH-MCNC: 2.9 GM/DL
GLUCOSE SERPL-MCNC: 120 MG/DL (ref 65–99)
HCT VFR BLD AUTO: 40.2 % (ref 37.5–51)
HGB BLD-MCNC: 13.2 G/DL (ref 13–17.7)
HOLD SPECIMEN: NORMAL
IMM GRANULOCYTES # BLD AUTO: 0.02 10*3/MM3 (ref 0–0.05)
IMM GRANULOCYTES NFR BLD AUTO: 0.2 % (ref 0–0.5)
LIPASE SERPL-CCNC: 25 U/L (ref 13–60)
LYMPHOCYTES # BLD AUTO: 1.19 10*3/MM3 (ref 0.7–3.1)
LYMPHOCYTES NFR BLD AUTO: 13.7 % (ref 19.6–45.3)
MCH RBC QN AUTO: 31.7 PG (ref 26.6–33)
MCHC RBC AUTO-ENTMCNC: 32.8 G/DL (ref 31.5–35.7)
MCV RBC AUTO: 96.4 FL (ref 79–97)
MONOCYTES # BLD AUTO: 1.02 10*3/MM3 (ref 0.1–0.9)
MONOCYTES NFR BLD AUTO: 11.8 % (ref 5–12)
NEUTROPHILS NFR BLD AUTO: 6.02 10*3/MM3 (ref 1.7–7)
NEUTROPHILS NFR BLD AUTO: 69.5 % (ref 42.7–76)
NRBC BLD AUTO-RTO: 0 /100 WBC (ref 0–0.2)
NT-PROBNP SERPL-MCNC: 118.4 PG/ML (ref 0–1800)
PLATELET # BLD AUTO: 204 10*3/MM3 (ref 140–450)
PMV BLD AUTO: 10.6 FL (ref 6–12)
POTASSIUM SERPL-SCNC: 4 MMOL/L (ref 3.5–5.2)
PROT SERPL-MCNC: 6.8 G/DL (ref 6–8.5)
RBC # BLD AUTO: 4.17 10*6/MM3 (ref 4.14–5.8)
SODIUM SERPL-SCNC: 137 MMOL/L (ref 136–145)
TROPONIN T SERPL HS-MCNC: 8 NG/L
WBC NRBC COR # BLD AUTO: 8.66 10*3/MM3 (ref 3.4–10.8)
WHOLE BLOOD HOLD COAG: NORMAL
WHOLE BLOOD HOLD SPECIMEN: NORMAL

## 2024-10-20 PROCEDURE — 25010000002 ONDANSETRON PER 1 MG: Performed by: EMERGENCY MEDICINE

## 2024-10-20 PROCEDURE — 96375 TX/PRO/DX INJ NEW DRUG ADDON: CPT

## 2024-10-20 PROCEDURE — 71045 X-RAY EXAM CHEST 1 VIEW: CPT

## 2024-10-20 PROCEDURE — 93005 ELECTROCARDIOGRAM TRACING: CPT

## 2024-10-20 PROCEDURE — 25010000002 MORPHINE PER 10 MG: Performed by: EMERGENCY MEDICINE

## 2024-10-20 PROCEDURE — 84484 ASSAY OF TROPONIN QUANT: CPT | Performed by: EMERGENCY MEDICINE

## 2024-10-20 PROCEDURE — 83690 ASSAY OF LIPASE: CPT | Performed by: EMERGENCY MEDICINE

## 2024-10-20 PROCEDURE — 99284 EMERGENCY DEPT VISIT MOD MDM: CPT

## 2024-10-20 PROCEDURE — 83880 ASSAY OF NATRIURETIC PEPTIDE: CPT | Performed by: EMERGENCY MEDICINE

## 2024-10-20 PROCEDURE — 93005 ELECTROCARDIOGRAM TRACING: CPT | Performed by: EMERGENCY MEDICINE

## 2024-10-20 PROCEDURE — 85025 COMPLETE CBC W/AUTO DIFF WBC: CPT | Performed by: EMERGENCY MEDICINE

## 2024-10-20 PROCEDURE — 96374 THER/PROPH/DIAG INJ IV PUSH: CPT

## 2024-10-20 PROCEDURE — 80053 COMPREHEN METABOLIC PANEL: CPT | Performed by: EMERGENCY MEDICINE

## 2024-10-20 RX ORDER — ONDANSETRON 2 MG/ML
4 INJECTION INTRAMUSCULAR; INTRAVENOUS ONCE
Status: COMPLETED | OUTPATIENT
Start: 2024-10-20 | End: 2024-10-20

## 2024-10-20 RX ORDER — METHOCARBAMOL 750 MG/1
750 TABLET, FILM COATED ORAL 3 TIMES DAILY
Qty: 21 TABLET | Refills: 0 | Status: SHIPPED | OUTPATIENT
Start: 2024-10-20 | End: 2024-10-27

## 2024-10-20 RX ORDER — HYDROCODONE BITARTRATE AND ACETAMINOPHEN 5; 325 MG/1; MG/1
1 TABLET ORAL ONCE
Status: COMPLETED | OUTPATIENT
Start: 2024-10-20 | End: 2024-10-20

## 2024-10-20 RX ORDER — SODIUM CHLORIDE 0.9 % (FLUSH) 0.9 %
10 SYRINGE (ML) INJECTION AS NEEDED
Status: DISCONTINUED | OUTPATIENT
Start: 2024-10-20 | End: 2024-10-20 | Stop reason: HOSPADM

## 2024-10-20 RX ORDER — HYDROCODONE BITARTRATE AND ACETAMINOPHEN 5; 325 MG/1; MG/1
1 TABLET ORAL EVERY 8 HOURS PRN
Qty: 9 TABLET | Refills: 0 | Status: SHIPPED | OUTPATIENT
Start: 2024-10-20 | End: 2024-10-23

## 2024-10-20 RX ORDER — ASPIRIN 81 MG/1
324 TABLET, CHEWABLE ORAL ONCE
Status: DISCONTINUED | OUTPATIENT
Start: 2024-10-20 | End: 2024-10-20 | Stop reason: HOSPADM

## 2024-10-20 RX ORDER — MORPHINE SULFATE 4 MG/ML
4 INJECTION, SOLUTION INTRAMUSCULAR; INTRAVENOUS ONCE
Status: COMPLETED | OUTPATIENT
Start: 2024-10-20 | End: 2024-10-20

## 2024-10-20 RX ADMIN — MORPHINE SULFATE 4 MG: 4 INJECTION, SOLUTION INTRAMUSCULAR; INTRAVENOUS at 01:17

## 2024-10-20 RX ADMIN — ONDANSETRON 4 MG: 2 INJECTION INTRAMUSCULAR; INTRAVENOUS at 01:17

## 2024-10-20 RX ADMIN — HYDROCODONE BITARTRATE AND ACETAMINOPHEN 1 TABLET: 5; 325 TABLET ORAL at 02:40

## 2024-10-20 NOTE — ED PROVIDER NOTES
Subjective   History of Present Illness  This is a 78-year-old male presented to the emergency department with some right-sided chest pain.  Patient has had the symptoms for the last 3 days.  He states that he was bending over working on the back of the toilet when he felt some pain on the right side of chest.  Is complaining of some significant pain in the right rib cage.  Is worse when he changes position or coughs.  Patient took some Aleve today and it relieved his symptoms, however it returned after the medication wore off.  He denies any direct trauma.  He is not having any cough or difficulty breathing.  No hemoptysis.  No fevers or chills.  No headache or change in vision.  No focal weakness.  No abdominal pain or vomiting    History provided by:  Patient   used: No        Review of Systems   Constitutional:  Negative for chills and fever.   HENT:  Negative for congestion, ear pain and sore throat.    Eyes:  Negative for visual disturbance.   Respiratory:  Negative for shortness of breath.    Cardiovascular:  Positive for chest pain.   Gastrointestinal:  Negative for abdominal pain.   Genitourinary:  Negative for difficulty urinating.   Musculoskeletal:  Negative for arthralgias.   Skin:  Negative for rash.   Neurological:  Negative for dizziness, weakness and numbness.   Psychiatric/Behavioral:  Negative for agitation.        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 Downing    JOINT REPLACEMENT  2019    knee replacement (2)    REPLACEMENT TOTAL KNEE Left 10/11/2019    charissa    REPLACEMENT TOTAL KNEE Right 12/10/2019    davidson    ROTATOR CUFF REPAIR Right 10/01/2014    Bijan    SHOULDER SURGERY Right 2015    surgery after rotator cuff b/c something tore loose       Family History   Problem Relation Age of Onset    Alcohol abuse Mother         alcoholic    Liver disease Mother         alcoholic    Migraines Mother         stopped after hysterectomy    Cervical cancer Mother     Cancer Mother         Uterine    Alcohol abuse Father         problem from breathing alcohol at work, very light drinker    Liver disease Father         from breathing alcohol at work    Heart attack Father         primary cause believed to be liver failure    Arthritis Father     Cirrhosis Maternal Grandmother     Alcohol abuse Maternal Grandmother         Alcoholic    Liver disease Maternal Grandmother          from it    Alcohol abuse Maternal Grandfather         heavy drinker    No Known Problems Paternal Grandmother     No Known Problems Paternal Grandfather        Social History     Socioeconomic History    Marital status:    Tobacco Use    Smoking status: Never    Smokeless tobacco: Never   Vaping Use    Vaping status: Never Used   Substance and Sexual Activity    Alcohol use: Yes     Alcohol/week: 10.0 standard drinks of alcohol     Types: 10 Glasses of wine per week     Comment: 2 glasses, 5 days a week    Drug use: No    Sexual activity: Not Currently     Partners: Female     Birth control/protection: None           Objective   Physical Exam  Vitals and nursing note reviewed.   Constitutional:       General: He is not in acute distress.     Appearance: He is not ill-appearing or toxic-appearing.   HENT:      Mouth/Throat:      Pharynx: No posterior oropharyngeal erythema.   Eyes:      Extraocular Movements: Extraocular movements intact.       Pupils: Pupils are equal, round, and reactive to light.   Cardiovascular:      Rate and Rhythm: Normal rate and regular rhythm.   Pulmonary:      Effort: Pulmonary effort is normal. No respiratory distress.   Chest:      Comments: Patient has tenderness palpation along the anterior right side of the chest.  There is no obvious deformity.  No crepitus.  Normal chest wall rise and fall  Abdominal:      General: Abdomen is flat. There is no distension.      Palpations: There is no mass.      Tenderness: There is no abdominal tenderness. There is no guarding or rebound.   Musculoskeletal:         General: No deformity. Normal range of motion.   Neurological:      General: No focal deficit present.      Mental Status: He is alert.      Sensory: No sensory deficit.      Motor: No weakness.         ECG 12 Lead ED Triage Standing Order; Chest Pain      Date/Time: 10/20/2024 2:27 AM    Performed by: Kota Oro MD  Authorized by: Kota Oro MD  Interpreted by ED physician  Comparison: compared with previous ECG   Similar to previous ECG  Rhythm: sinus rhythm  Rate: normal  BPM: 63  QRS axis: normal  Conduction: conduction normal  Other findings: LVH  Other findings comments: Nonspecific ST changes  Clinical impression: non-specific ECG  Comments: Interpretation:  EKG was directly visualized by myself, interpretations as documented in hospital course.               ED Course  ED Course as of 10/20/24 0231   Sun Oct 20, 2024   0228 BP: 138/81 [JK]   0228 Temp: 98.2 °F (36.8 °C) [JK]   0228 Temp src: Oral [JK]   0228 Heart Rate: 64 [JK]   0228 Resp: 18 [JK]   0228 SpO2: 96 %  Interpretation:  Patient's repeat vitals, telemetry tracing, and pulse oximetry tracing were directly viewed and interpreted by myself.   O2 sat 96% on room air, interpreted as normal  Telemetry rhythm strip revealed a rate of 64 bpm, interpreted as normal sinus rhythm [JK]   0228 CBC & Differential(!) [JK]   0228 Lipase [JK]   0228 BNP  [JK]   0228 High Sensitivity Troponin T [JK]   0228 Comprehensive Metabolic Panel(!)  Interpretation:  Laboratory studies were reviewed and interpreted directly by myself.  CMP was unremarkable, BNP normal, troponin normal, lipase normal, CBC normal [JK]   0228 XR Chest 1 View  Interpretation:  Imaging was directly visualized by myself, per my interpretations, chest x-ray was unremarkable. [JK]   0229 On reevaluation, the patient is feeling much better.  Vital signs have improved.  Overall they are well-appearing.  There were no abnormal cardiopulmonary findings.  No respiratory distress.  Abdomen soft, nontender and no evidence of acute abdomen.  Story is minimally concerning for ACS, PE or dissection given the atypical nature, risk factors, history and physical examination.  Patient's heart score places the patient at low risk for acute coronary syndrome.  Patient feels comfortable following up with her primary care physician and/or primary cardiologist.  Patient was given strict return precautions.  Verbalized understanding. [JK]   0229 I had a discussion with the patient/family regarding diagnosis, diagnostic results, treatment plan, and medications. The patient/family indicated understanding of these instructions. I spent adequate time at the bedside prior to discharge necessary to discuss the aftercare instructions, giving patient education, providing explanations of the results of our evaluations/findings, and my decision making to assure that the patient/family understand the plan of care. Time was allotted to answer questions at that time and throughout the ED course. Patient is required to maintain timely follow up, as discussed. I also discussed the potential for the development of an acute emergent condition requiring further evaluation, return to the ER, admission, or even surgical intervention.  I encouraged the patient to return to the emergency department immediately for any concerns, worsening  symptoms, new complaints, or if symptoms persist and they are unable to seek follow-up in a timely fashion. The patient/family expressed understanding and agreement with this plan    Shared decision making:   After full review of the patient's clinical presentation, review of any work-up including but not limited to laboratory studies and radiology obtained, I had a discussion with the patient.  Treatment options were discussed as well as the risks, benefits and consequences.  I discussed all findings with the patient and family members if available.  During the discussion, treatment goals were understood by all as well as any misconceptions which were addressed with the patient.  Ample time was given for any questions they may have had.  They are in agreement with the treatment plan as well as final disposition. [JK]      ED Course User Index  [JK] Kota Oro MD                HEART Score: 3                      ÓSCAR reviewed by Kota Oro MD       Medical Decision Making  This is a 78-year-old male presenting to the emergency department with a 3-day history of some chest discomfort.  The patient has reproducible chest pain to the anterior chest wall.  Findings are concerning for rib fracture or anterior interosseous strain.  Seems atypical for ACS.  Patient's EKG nonspecific.  Overall, the patient is nontoxic.  Afebrile.  IV access was established and the patient.  Placed on continuous telemetry monitoring.  Given the patient's presentation, differential is broad and will require further evaluation.  Workup initiated.      Differential diagnosis: CAD, ACS, peptic ulcer disease, dyspepsia, pneumonia, bronchitis, atypical chest pain, angina, musculoskeletal pain      Amount and/or Complexity of Data Reviewed  External Data Reviewed: labs, radiology, ECG and notes.     Details: External laboratories, imaging as well as notes were reviewed personally by myself.  All relevant studies were used to  guide decision making.     Date of previous record: 8/4/2023    Source of note: Primary physician    Summary:  Patient was seen and evaluated for routine visit.  I did review basic laboratory studies on file as well as a previous chest x-ray and EKG.  Records reviewed    Labs: ordered. Decision-making details documented in ED Course.  Radiology: ordered and independent interpretation performed. Decision-making details documented in ED Course.  ECG/medicine tests: ordered and independent interpretation performed. Decision-making details documented in ED Course.    Risk  OTC drugs.  Prescription drug management.        Final diagnoses:   Chest wall pain   Costochondritis       ED Disposition  ED Disposition       ED Disposition   Discharge    Condition   Stable    Comment   --               Nargis Rosenthal MD  [address]  [address]  [place] [id number] [phone]    Call in 1 day           Medication List        New Prescriptions      HYDROcodone-acetaminophen 5-325 MG per tablet  Commonly known as: NORCO  Take 1 tablet by mouth Every 8 (Eight) Hours As Needed for Moderate Pain for up to 3 days.     methocarbamol 750 MG tablet  Commonly known as: ROBAXIN  Take 1 tablet by mouth 3 (Three) Times a Day for 7 days.               Where to Get Your Medications        These medications were sent to [facility] [id number] - [place], [place] - [address] - [phone]  - [phone]  [address] [facility] [address] [place] [address]      Phone: [phone]   HYDROcodone-acetaminophen 5-325 MG per tablet  methocarbamol 750 MG tablet            Kota Oro MD  10/20/24 0231

## 2024-10-21 LAB
QT INTERVAL: 400 MS
QTC INTERVAL: 409 MS

## 2024-10-23 ENCOUNTER — OFFICE VISIT (OUTPATIENT)
Dept: INTERNAL MEDICINE | Facility: CLINIC | Age: 79
End: 2024-10-23
Payer: MEDICARE

## 2024-10-23 VITALS
DIASTOLIC BLOOD PRESSURE: 68 MMHG | OXYGEN SATURATION: 95 % | BODY MASS INDEX: 27.8 KG/M2 | SYSTOLIC BLOOD PRESSURE: 114 MMHG | HEIGHT: 75 IN | WEIGHT: 223.6 LBS | HEART RATE: 84 BPM | TEMPERATURE: 97.8 F

## 2024-10-23 DIAGNOSIS — Z23 NEED FOR VACCINATION: Primary | ICD-10-CM

## 2024-10-23 PROCEDURE — 1160F RVW MEDS BY RX/DR IN RCRD: CPT | Performed by: INTERNAL MEDICINE

## 2024-10-23 PROCEDURE — 90662 IIV NO PRSV INCREASED AG IM: CPT | Performed by: INTERNAL MEDICINE

## 2024-10-23 PROCEDURE — G0008 ADMIN INFLUENZA VIRUS VAC: HCPCS | Performed by: INTERNAL MEDICINE

## 2024-10-23 PROCEDURE — 99213 OFFICE O/P EST LOW 20 MIN: CPT | Performed by: INTERNAL MEDICINE

## 2024-10-23 PROCEDURE — 1159F MED LIST DOCD IN RCRD: CPT | Performed by: INTERNAL MEDICINE

## 2024-10-23 PROCEDURE — 1125F AMNT PAIN NOTED PAIN PRSNT: CPT | Performed by: INTERNAL MEDICINE

## 2024-10-23 RX ORDER — CLINDAMYCIN HCL 300 MG
300 CAPSULE ORAL 3 TIMES DAILY
COMMUNITY

## 2024-10-23 NOTE — PROGRESS NOTES
Chief Complaint  No chief complaint on file.    Subjective          Chris Velásquez presents to [facility]    History of Present Illness  The patient is here for chest wall pain. He is accompanied by his wife.    He reports a sudden onset of pain, initially perceived as a pulled muscle located just below the rib. The pain persisted for a few days before intensifying one night. It was not severe when standing or sitting but became pronounced upon lying down. The pain, described as splintering, would intensify with certain movements, causing a brief period of tightness followed by pain. The pain was particularly severe on the night of 10/18/2024. The following morning, he experienced soreness, which was alleviated by three Advil tablets. Despite the pain, he was able to function throughout the day. However, the pain returned upon lying down that night, prompting a visit to the ED. There, he was administered morphine but continued to experience spasms. After a series of tests, he was diagnosed with costochondritis.  Single view chest x-ray was negative.  Troponin was negative.  EKG showed no significant change.  Blood work was essentially normal    He reports that the pain is not as severe during the day but intensifies when pressure is applied to the rib cage while lying down. He also reports discomfort when lying on his back or right side. He has been avoiding movement due to the anticipation of pain. Deep breaths are painful, but short breaths are manageable. He estimates that his condition has improved by 80 to 85 percent. He has no previous similar experiences. The pain can radiate up to the clavicle and into the back. He is not experiencing any shortness of breath. The pain is not triggered by touch but can be felt when pressure is applied. He describes the pain as similar to a cracked rib, despite never having had one. The pain was still present when he went to bed last night  but was manageable with medication. He is considering physical therapy as a potential treatment option.    He continues to feel the pain when lying down but is able to sleep. His wife has been administering methocarbamol and Advil, and he has taken two hydrocodone tablets. He has not taken any medication since Sunday night at 9 PM.           Current Outpatient Medications:     cholecalciferol (VITAMIN D3) 1000 units tablet, Take 2 tablets by mouth Daily., Disp: , Rfl:     HYDROcodone-acetaminophen (NORCO) 5-325 MG per tablet, Take 1 tablet by mouth Every 8 (Eight) Hours As Needed for Moderate Pain for up to 3 days., Disp: 9 tablet, Rfl: 0    meloxicam (MOBIC) 15 MG tablet, Take 1 tablet by mouth Daily., Disp: 90 tablet, Rfl: 3    methocarbamol (ROBAXIN) 750 MG tablet, Take 1 tablet by mouth 3 (Three) Times a Day for 7 days., Disp: 21 tablet, Rfl: 0   The following portions of the patient's history were reviewed and updated as appropriate: allergies, current medications, past family history, past medical history, past social history, past surgical history and problem list.     Objective   Vital Signs:   There were no vitals taken for this visit.      Physical exam  Constitutional: oriented to person, place, and time.  well-developed and well-nourished. No distress.   HENT:   Head: Normocephalic and atraumatic.   Eyes: Conjunctivae and EOM are normal.   Cardiovascular: Normal rate, regular rhythm and normal heart sounds.  Exam reveals no gallop and no friction rub.    No murmur heard.  Pulmonary/Chest: Effort normal and breath sounds normal. No respiratory distress.   no wheezes.  Slight anterior rib tenderness  Abd: soft, no right upper quadrant tenderness  Neurological:  alert and oriented to person, place, and time.   Skin: Skin is warm and dry. not diaphoretic.   Psychiatric:  normal mood and affect. behavior is normal. Judgment and thought content normal.      Physical Exam     Physical Exam         Results          Result Review :   The following data was reviewed by: Nargis Rosenthal MD on 10/23/2024:  Common labs          8/14/2024    09:12 10/20/2024    01:16   Common Labs   Glucose 86  120    BUN 20  21    Creatinine 0.86  0.80    Sodium 138  137    Potassium 4.1  4.0    Chloride 101  102    Calcium 9.4  9.0    Albumin 4.3  3.9    Total Bilirubin 0.8  0.5    Alkaline Phosphatase 81  108    AST (SGOT) 24  29    ALT (SGPT) 16  14    WBC 5.54  8.66    Hemoglobin 14.6  13.2    Hematocrit 44.1  40.2    Platelets 165  204    Total Cholesterol 183     Triglycerides 76     HDL Cholesterol 53     LDL Cholesterol  116     PSA 2.580         Data reviewed : Radiologic studies cxr and Recent hospitalization notes ED notes           Assessment and Plan          Assessment & Plan  1. Costochondritis.  The patient's symptoms and physical examination findings are consistent with costochondritis. He reports an 80-85% improvement in his symptoms. He has been managing the pain with methocarbamol and Advil at night. A referral for physical therapy to help strengthen core and upper extremities.  Can continue the muscle relaxer and Advil as needed but probably will be able to stop over the next few days since he is back to baseline almost.  If symptoms persist or worsen, a CAT scan of the chest may be considered.    I gave him a written order for PT and he will do at [facility] if she has used before  High-dose flu shot today         Follow Up   No follow-ups on file.  Patient was given instructions and counseling regarding his condition or for health maintenance advice. Please see specific information pulled into the AVS if appropriate.     Patient or patient representative verbalized consent for the use of Ambient Listening during the visit with  Nargis Rosenthal MD for chart documentation. 10/23/2024  14:44 EDT

## 2025-02-15 ENCOUNTER — APPOINTMENT (OUTPATIENT)
Dept: GENERAL RADIOLOGY | Facility: HOSPITAL | Age: 80
End: 2025-02-15
Payer: MEDICARE

## 2025-02-15 ENCOUNTER — HOSPITAL ENCOUNTER (EMERGENCY)
Facility: HOSPITAL | Age: 80
Discharge: HOME | End: 2025-02-16
Attending: EMERGENCY MEDICINE | Admitting: EMERGENCY MEDICINE
Payer: MEDICARE

## 2025-02-15 DIAGNOSIS — R55 NEAR SYNCOPE: Primary | ICD-10-CM

## 2025-02-15 LAB
ALBUMIN SERPL-MCNC: 3.8 G/DL (ref 3.5–5.2)
ALBUMIN/GLOB SERPL: 1.9 G/DL
ALP SERPL-CCNC: 76 U/L (ref 39–117)
ALT SERPL W P-5'-P-CCNC: 18 U/L (ref 1–41)
ANION GAP SERPL CALCULATED.3IONS-SCNC: 9 MMOL/L (ref 5–15)
AST SERPL-CCNC: 22 U/L (ref 1–40)
BASOPHILS # BLD AUTO: 0.03 10*3/MM3 (ref 0–0.2)
BASOPHILS NFR BLD AUTO: 0.6 % (ref 0–1.5)
BILIRUB SERPL-MCNC: 0.3 MG/DL (ref 0–1.2)
BUN SERPL-MCNC: 17 MG/DL (ref 8–23)
BUN/CREAT SERPL: 22.7 (ref 7–25)
CALCIUM SPEC-SCNC: 8.4 MG/DL (ref 8.6–10.5)
CHLORIDE SERPL-SCNC: 105 MMOL/L (ref 98–107)
CO2 SERPL-SCNC: 27 MMOL/L (ref 22–29)
CREAT SERPL-MCNC: 0.75 MG/DL (ref 0.76–1.27)
D DIMER PPP FEU-MCNC: <0.27 MCGFEU/ML (ref 0–0.77)
D-LACTATE SERPL-SCNC: 1.4 MMOL/L (ref 0.5–2)
DEPRECATED RDW RBC AUTO: 46.5 FL (ref 37–54)
EGFRCR SERPLBLD CKD-EPI 2021: 92.9 ML/MIN/1.73
EOSINOPHIL # BLD AUTO: 0.34 10*3/MM3 (ref 0–0.4)
EOSINOPHIL NFR BLD AUTO: 6.7 % (ref 0.3–6.2)
ERYTHROCYTE [DISTWIDTH] IN BLOOD BY AUTOMATED COUNT: 13.6 % (ref 12.3–15.4)
FLUAV RNA RESP QL NAA+PROBE: NOT DETECTED
FLUBV RNA RESP QL NAA+PROBE: NOT DETECTED
GLOBULIN UR ELPH-MCNC: 2 GM/DL
GLUCOSE SERPL-MCNC: 111 MG/DL (ref 65–99)
HCT VFR BLD AUTO: 39.8 % (ref 37.5–51)
HGB BLD-MCNC: 12.9 G/DL (ref 13–17.7)
HOLD SPECIMEN: NORMAL
IMM GRANULOCYTES # BLD AUTO: 0.01 10*3/MM3 (ref 0–0.05)
IMM GRANULOCYTES NFR BLD AUTO: 0.2 % (ref 0–0.5)
LIPASE SERPL-CCNC: 26 U/L (ref 13–60)
LYMPHOCYTES # BLD AUTO: 1.85 10*3/MM3 (ref 0.7–3.1)
LYMPHOCYTES NFR BLD AUTO: 36.5 % (ref 19.6–45.3)
MCH RBC QN AUTO: 30.4 PG (ref 26.6–33)
MCHC RBC AUTO-ENTMCNC: 32.4 G/DL (ref 31.5–35.7)
MCV RBC AUTO: 93.9 FL (ref 79–97)
MONOCYTES # BLD AUTO: 0.61 10*3/MM3 (ref 0.1–0.9)
MONOCYTES NFR BLD AUTO: 12 % (ref 5–12)
NEUTROPHILS NFR BLD AUTO: 2.23 10*3/MM3 (ref 1.7–7)
NEUTROPHILS NFR BLD AUTO: 44 % (ref 42.7–76)
NRBC BLD AUTO-RTO: 0 /100 WBC (ref 0–0.2)
NT-PROBNP SERPL-MCNC: 89 PG/ML (ref 0–1800)
PLATELET # BLD AUTO: 151 10*3/MM3 (ref 140–450)
PMV BLD AUTO: 10.9 FL (ref 6–12)
POTASSIUM SERPL-SCNC: 3.7 MMOL/L (ref 3.5–5.2)
PROT SERPL-MCNC: 5.8 G/DL (ref 6–8.5)
RBC # BLD AUTO: 4.24 10*6/MM3 (ref 4.14–5.8)
SARS-COV-2 RNA RESP QL NAA+PROBE: NOT DETECTED
SODIUM SERPL-SCNC: 141 MMOL/L (ref 136–145)
TROPONIN T SERPL HS-MCNC: 7 NG/L
WBC NRBC COR # BLD AUTO: 5.07 10*3/MM3 (ref 3.4–10.8)
WHOLE BLOOD HOLD COAG: NORMAL
WHOLE BLOOD HOLD SPECIMEN: NORMAL

## 2025-02-15 PROCEDURE — 36415 COLL VENOUS BLD VENIPUNCTURE: CPT

## 2025-02-15 PROCEDURE — 83880 ASSAY OF NATRIURETIC PEPTIDE: CPT | Performed by: EMERGENCY MEDICINE

## 2025-02-15 PROCEDURE — 99284 EMERGENCY DEPT VISIT MOD MDM: CPT

## 2025-02-15 PROCEDURE — 87636 SARSCOV2 & INF A&B AMP PRB: CPT | Performed by: EMERGENCY MEDICINE

## 2025-02-15 PROCEDURE — 83690 ASSAY OF LIPASE: CPT | Performed by: EMERGENCY MEDICINE

## 2025-02-15 PROCEDURE — 84484 ASSAY OF TROPONIN QUANT: CPT | Performed by: EMERGENCY MEDICINE

## 2025-02-15 PROCEDURE — 71045 X-RAY EXAM CHEST 1 VIEW: CPT

## 2025-02-15 PROCEDURE — 80053 COMPREHEN METABOLIC PANEL: CPT | Performed by: EMERGENCY MEDICINE

## 2025-02-15 PROCEDURE — 93005 ELECTROCARDIOGRAM TRACING: CPT | Performed by: EMERGENCY MEDICINE

## 2025-02-15 PROCEDURE — 85025 COMPLETE CBC W/AUTO DIFF WBC: CPT | Performed by: EMERGENCY MEDICINE

## 2025-02-15 PROCEDURE — 83605 ASSAY OF LACTIC ACID: CPT | Performed by: EMERGENCY MEDICINE

## 2025-02-15 PROCEDURE — 85379 FIBRIN DEGRADATION QUANT: CPT | Performed by: EMERGENCY MEDICINE

## 2025-02-15 PROCEDURE — 25810000003 SODIUM CHLORIDE 0.9 % SOLUTION: Performed by: EMERGENCY MEDICINE

## 2025-02-15 RX ORDER — ASPIRIN 81 MG/1
324 TABLET, CHEWABLE ORAL ONCE
Status: COMPLETED | OUTPATIENT
Start: 2025-02-15 | End: 2025-02-15

## 2025-02-15 RX ORDER — SODIUM CHLORIDE 0.9 % (FLUSH) 0.9 %
10 SYRINGE (ML) INJECTION AS NEEDED
Status: DISCONTINUED | OUTPATIENT
Start: 2025-02-15 | End: 2025-02-16 | Stop reason: HOSPADM

## 2025-02-15 RX ADMIN — ASPIRIN 324 MG: 81 TABLET, CHEWABLE ORAL at 22:03

## 2025-02-15 RX ADMIN — SODIUM CHLORIDE 1000 ML: 9 INJECTION, SOLUTION INTRAVENOUS at 22:55

## 2025-02-16 VITALS
HEART RATE: 66 BPM | WEIGHT: 223 LBS | DIASTOLIC BLOOD PRESSURE: 70 MMHG | HEIGHT: 75 IN | SYSTOLIC BLOOD PRESSURE: 122 MMHG | TEMPERATURE: 97.6 F | OXYGEN SATURATION: 94 % | RESPIRATION RATE: 16 BRPM | BODY MASS INDEX: 27.73 KG/M2

## 2025-02-16 LAB
GEN 5 1HR TROPONIN T REFLEX: 7 NG/L
TROPONIN T NUMERIC DELTA: 0 NG/L

## 2025-02-16 NOTE — ED PROVIDER NOTES
"Subjective   History of Present Illness  79 year old male presents to the emergency department brought by EMS accompanied by his wife and son for evaluation of an episode of near syncope. He had dinner and was watching a movie, had some wine, and started feeling unwell and had chills. He got up and felt generalized weakness and dizziness. His wife took notice and went to him and broke his fall so he did not sustain any injuries. He denies loss of consciousness. He had left chest discomfort and didn't feel well for several minutes. EMS arrived and the patient had a second episode with EMS while en route to the emergency department. EMS reports his heart rate went down into the 40's, and his blood pressure was low, in the 80's systolic. He appeared pale in appearance and had nausea. EMS gave IV crystalloid with improvement in his symptoms and blood pressure. He has had similar prior episodes years ago which were fully evaluated with negative echocardiogram and Holter monitor study per patient. He states this episode was different, however, because he didn't have the left sided chest discomfort with his prior episodes, but did this time. Also, he felt unwell for a longer duration of time after the episode this time. Chest discomfort resolved prior to my initial evaluation of the patient.      Review of Systems   Constitutional:  Positive for chills. Negative for fever.   HENT:  Negative for facial swelling and nosebleeds.    Eyes:  Negative for photophobia and discharge.   Respiratory:  Negative for cough and stridor.    Cardiovascular:  Positive for chest pain (\"discomfort\" aching). Negative for palpitations and leg swelling.   Gastrointestinal:  Positive for nausea. Negative for abdominal pain.   Neurological:  Positive for dizziness. Negative for facial asymmetry.       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 Downing    JOINT REPLACEMENT  2019    knee replacement (2)    REPLACEMENT TOTAL KNEE Left 10/11/2019    [facility]    REPLACEMENT TOTAL KNEE Right 12/10/2019    [facility]    ROTATOR CUFF REPAIR Right 10/01/2014    [place]    SHOULDER SURGERY Right 2015    surgery after rotator cuff b/c something tore loose       Family History   Problem Relation Age of Onset    Alcohol abuse Mother         alcoholic    Liver disease Mother         alcoholic    Migraines Mother         stopped after hysterectomy    Cervical cancer Mother     Cancer Mother         Uterine    Alcohol abuse Father         problem from breathing alcohol at work, very light drinker    Liver disease Father         from breathing alcohol at work    Heart attack Father         primary cause believed to be liver failure    Arthritis Father     Cirrhosis Maternal Grandmother     Alcohol abuse Maternal Grandmother         Alcoholic    Liver disease Maternal Grandmother          from it    Alcohol abuse Maternal Grandfather         heavy drinker    No Known Problems Paternal Grandmother     No Known Problems Paternal Grandfather        Social History     Socioeconomic History    Marital status:    Tobacco Use    Smoking status: Never    Smokeless tobacco: Never   Vaping Use    Vaping status: Never Used   Substance and Sexual Activity    Alcohol use: Yes     Alcohol/week: 10.0 standard drinks of alcohol     Types: 10 Glasses of wine per week     Comment: 2 glasses, 5 days a week    Drug use: No    Sexual  activity: Not Currently     Partners: Female     Birth control/protection: None           Objective   Physical Exam  Vitals and nursing note reviewed.   Constitutional:       General: He is not in acute distress.     Appearance: He is not diaphoretic.      Comments: BMI 27.   Eyes:      Extraocular Movements: Extraocular movements intact.      Comments: No photophobia or nystagmus.   Neck:      Trachea: No tracheal deviation.   Cardiovascular:      Rate and Rhythm: Normal rate and regular rhythm.      Heart sounds: Normal heart sounds. No murmur heard.     No friction rub. No gallop.   Pulmonary:      Effort: Pulmonary effort is normal. No tachypnea, accessory muscle usage or respiratory distress.      Breath sounds: Normal breath sounds. No stridor. No decreased breath sounds, wheezing, rhonchi or rales.   Chest:      Chest wall: No tenderness or crepitus.   Abdominal:      Palpations: Abdomen is soft.      Tenderness: There is no abdominal tenderness. There is no guarding.      Comments: Nondistended.   Musculoskeletal:      Cervical back: Neck supple.      Comments: 2+ radial pulses bilaterally, symmetric. No significant peripheral edema. No calf tenderness bilaterally.   Skin:     General: Skin is warm and dry.      Coloration: Skin is not cyanotic.      Comments: Mild pallor   Neurological:      Mental Status: He is alert.      Comments: Normal speech, no dysarthria. No facial droop. Motor 5/5 power x 4 extremities, symmetric. Sensation grossly intact to light touch. Rapid alternating movements within normal limits. No dysmetria or past-pointing on finger-to-nose testing.                      ED Course  ED Course as of 02/16/25 0745   Sat Feb 15, 2025   2337 D-Dimer, Quant: <0.27 [LD]   2337 HS Troponin T: 7 [LD]   2337 proBNP: 89.0 [LD]   2337 Lipase: 26 [LD]   2338 Lactate: 1.4 [LD]   2338 Lipase: 26 [LD]   2338 Hemoglobin(!): 12.9 [LD]   2338 Platelets: 151 [LD]   2338 Glucose(!): 111 [LD]   2338 BUN: 17  [LD]   2338 Creatinine(!): 0.75 [LD]   2338 Sodium: 141 [LD]   2338 Potassium: 3.7 [LD]   2338 Chloride: 105 [LD]   2338 CO2: 27.0 [LD]   2338 Calcium(!): 8.4 [LD]   2338 Total Protein(!): 5.8 [LD]   2338 Albumin: 3.8 [LD]   2338 ALT (SGPT): 18 [LD]   2338 AST (SGOT): 22 [LD]   2338 Alkaline Phosphatase: 76 [LD]   2338 Total Bilirubin: 0.3 [LD]   2338 eGFR: 92.9 [LD]   Sun Feb 16, 2025 0052 COVID19: Not Detected [LD]   0052 Influenza A PCR: Not Detected [LD]   0052 Influenza B PCR: Not Detected [LD]   0052 HS Troponin T: 7 [LD]   0052 Troponin T Numeric Delta: 0 [LD]   0140 Results and plan discussed with the patient and his wife and son at the bedside. Unremarkable ED course, no arrhythmias on telemetry monitoring, no further episodes of chest discomfort or near syncope. Stood in the room to use the urinal and didn't have symptoms with this per patient. He and his family were offered hospitalization for repeat Holter monitor set up and repeat echo and further monitoring, but they decline hospitalization at this time which I feel is a reasonable approach given his reassuring evaluation in the emergency department and similar prior episodes in the past with negative work ups. Return precautions were discussed.  [LD]      ED Course User Index  [LD] Gisselle Gaston MD                              [place] Syncope Rule Score: 0                          Medical Decision Making  Differential diagnosis includes vasovagal event, dehydration, less likely cardiac arrhythmia, anemia, and others.    Problems Addressed:  Near syncope: complicated acute illness or injury    Amount and/or Complexity of Data Reviewed  Independent Historian: spouse and EMS     Details: Son at bedside  External Data Reviewed: notes.     Details: 10/23/18 stress test report reviewed, low risk study. 11/13/18 Holter monitor report reviewed (had two brief episodes of NSVT, one was 4 beats, one was 5 beats).   Labs: ordered. Decision-making  details documented in ED Course.  Radiology: ordered. Decision-making details documented in ED Course.  ECG/medicine tests: ordered and independent interpretation performed. Decision-making details documented in ED Course.     Details: EKG at 2148 shows sinus bradycardia with premature atrial complexes at a rate of 59 beats per minute, no acute ischemic changes. Repeat EKG at 2325 shows sinus bradycardia at a rate of 58 beats per minute, nonspecific T wave inversion inferior leads.    Risk  OTC drugs.  Prescription drug management.      Recent Results (from the past 24 hours)   ECG 12 Lead ED Triage Standing Order; Chest Pain    Collection Time: 02/15/25  9:48 PM   Result Value Ref Range    QT Interval 436 ms    QTC Interval 431 ms   High Sensitivity Troponin T    Collection Time: 02/15/25 10:01 PM    Specimen: Blood   Result Value Ref Range    HS Troponin T 7 <22 ng/L   Comprehensive Metabolic Panel    Collection Time: 02/15/25 10:01 PM    Specimen: Blood   Result Value Ref Range    Glucose 111 (H) 65 - 99 mg/dL    BUN 17 8 - 23 mg/dL    Creatinine 0.75 (L) 0.76 - 1.27 mg/dL    Sodium 141 136 - 145 mmol/L    Potassium 3.7 3.5 - 5.2 mmol/L    Chloride 105 98 - 107 mmol/L    CO2 27.0 22.0 - 29.0 mmol/L    Calcium 8.4 (L) 8.6 - 10.5 mg/dL    Total Protein 5.8 (L) 6.0 - 8.5 g/dL    Albumin 3.8 3.5 - 5.2 g/dL    ALT (SGPT) 18 1 - 41 U/L    AST (SGOT) 22 1 - 40 U/L    Alkaline Phosphatase 76 39 - 117 U/L    Total Bilirubin 0.3 0.0 - 1.2 mg/dL    Globulin 2.0 gm/dL    A/G Ratio 1.9 g/dL    BUN/Creatinine Ratio 22.7 7.0 - 25.0    Anion Gap 9.0 5.0 - 15.0 mmol/L    eGFR 92.9 >60.0 mL/min/1.73   Lipase    Collection Time: 02/15/25 10:01 PM    Specimen: Blood   Result Value Ref Range    Lipase 26 13 - 60 U/L   BNP    Collection Time: 02/15/25 10:01 PM    Specimen: Blood   Result Value Ref Range    proBNP 89.0 0.0 - 1,800.0 pg/mL   Green Top (Gel)    Collection Time: 02/15/25 10:01 PM   Result Value Ref Range    Extra Tube  Hold for add-ons.    Lavender Top    Collection Time: 02/15/25 10:01 PM   Result Value Ref Range    Extra Tube hold for add-on    Gold Top - SST    Collection Time: 02/15/25 10:01 PM   Result Value Ref Range    Extra Tube Hold for add-ons.    Gray Top    Collection Time: 02/15/25 10:01 PM   Result Value Ref Range    Extra Tube Hold for add-ons.    Light Blue Top    Collection Time: 02/15/25 10:01 PM   Result Value Ref Range    Extra Tube Hold for add-ons.    CBC Auto Differential    Collection Time: 02/15/25 10:01 PM    Specimen: Blood   Result Value Ref Range    WBC 5.07 3.40 - 10.80 10*3/mm3    RBC 4.24 4.14 - 5.80 10*6/mm3    Hemoglobin 12.9 (L) 13.0 - 17.7 g/dL    Hematocrit 39.8 37.5 - 51.0 %    MCV 93.9 79.0 - 97.0 fL    MCH 30.4 26.6 - 33.0 pg    MCHC 32.4 31.5 - 35.7 g/dL    RDW 13.6 12.3 - 15.4 %    RDW-SD 46.5 37.0 - 54.0 fl    MPV 10.9 6.0 - 12.0 fL    Platelets 151 140 - 450 10*3/mm3    Neutrophil % 44.0 42.7 - 76.0 %    Lymphocyte % 36.5 19.6 - 45.3 %    Monocyte % 12.0 5.0 - 12.0 %    Eosinophil % 6.7 (H) 0.3 - 6.2 %    Basophil % 0.6 0.0 - 1.5 %    Immature Grans % 0.2 0.0 - 0.5 %    Neutrophils, Absolute 2.23 1.70 - 7.00 10*3/mm3    Lymphocytes, Absolute 1.85 0.70 - 3.10 10*3/mm3    Monocytes, Absolute 0.61 0.10 - 0.90 10*3/mm3    Eosinophils, Absolute 0.34 0.00 - 0.40 10*3/mm3    Basophils, Absolute 0.03 0.00 - 0.20 10*3/mm3    Immature Grans, Absolute 0.01 0.00 - 0.05 10*3/mm3    nRBC 0.0 0.0 - 0.2 /100 WBC   Lactic Acid, Plasma    Collection Time: 02/15/25 10:01 PM    Specimen: Blood   Result Value Ref Range    Lactate 1.4 0.5 - 2.0 mmol/L   D-dimer, Quantitative    Collection Time: 02/15/25 10:01 PM    Specimen: Blood   Result Value Ref Range    D-Dimer, Quantitative <0.27 0.00 - 0.77 MCGFEU/mL   COVID-19 and FLU A/B PCR, 1 HR TAT - Swab, Nasopharynx    Collection Time: 02/15/25 10:57 PM    Specimen: Nasopharynx; Swab   Result Value Ref Range    COVID19 Not Detected Not Detected - Ref. Range     Influenza A PCR Not Detected Not Detected    Influenza B PCR Not Detected Not Detected   ECG 12 Lead ED Triage Standing Order; Chest Pain    Collection Time: 02/15/25 11:25 PM   Result Value Ref Range    QT Interval 444 ms    QTC Interval 435 ms   High Sensitivity Troponin T 1Hr    Collection Time: 02/15/25 11:35 PM    Specimen: Blood   Result Value Ref Range    HS Troponin T 7 <22 ng/L    Troponin T Numeric Delta 0 Abnormal if >/=3 ng/L     Note: In addition to lab results from this visit, the labs listed above may include labs taken at another facility or during a different encounter within the last 24 hours. Please correlate lab times with ED admission and discharge times for further clarification of the services performed during this visit.    XR Chest 1 View   Final Result   Impression:   No active disease.               Electronically Signed: Vic Rudolph MD     2/15/2025 10:11 PM EST     Workstation ID: [id number]        Vitals:    02/16/25 0000 02/16/25 0030 02/16/25 0100 02/16/25 0130   BP: 110/71 115/67 140/77 122/70   Pulse: 62 63 65 66   Resp: 16 16 16 16   Temp:       TempSrc:       SpO2: 96% 93% 96% 94%   Weight:       Height:         Medications   aspirin chewable tablet 324 mg (324 mg Oral Given 2/15/25 2203)   sodium chloride 0.9 % bolus 1,000 mL (0 mL Intravenous Stopped 2/16/25 0123)     ECG/EMG Results (last 24 hours)       Procedure Component Value Units Date/Time    ECG 12 Lead ED Triage Standing Order; Chest Pain [[id number]] Collected: 02/15/25 2148     Updated: 02/15/25 2148     QT Interval 436 ms      QTC Interval 431 ms     Narrative:      Test Reason : ED Triage Standing Order~  Blood Pressure :   */*   mmHG  Vent. Rate :  59 BPM     Atrial Rate :  59 BPM     P-R Int : 202 ms          QRS Dur : 102 ms      QT Int : 436 ms       P-R-T Axes :  30 -10  30 degrees    QTcB Int : 431 ms    Sinus bradycardia with premature atrial complexes  Otherwise normal ECG  When compared with ECG of  20-Oct-2024 00:44,  premature atrial complexes are now present    Referred By: edmd           Confirmed By:     ECG 12 Lead ED Triage Standing Order; Chest Pain [[id number]] Collected: 02/15/25 2325     Updated: 02/15/25 2324     QT Interval 444 ms      QTC Interval 435 ms     Narrative:      Test Reason : ED Triage Standing Order~  Blood Pressure :   */*   mmHG  Vent. Rate :  58 BPM     Atrial Rate :  58 BPM     P-R Int : 202 ms          QRS Dur : 102 ms      QT Int : 444 ms       P-R-T Axes :  61  -7  -7 degrees    QTcB Int : 435 ms    Sinus bradycardia  Minimal voltage criteria for LVH, may be normal variant  Septal infarct , age undetermined  Abnormal ECG  When compared with ECG of 15-Feb-2025 21:48, (Unconfirmed)  premature atrial complexes are no longer present  Nonspecific T wave abnormality now evident in Lateral leads    Referred By: alesia           Confirmed By:           ECG 12 Lead ED Triage Standing Order; Chest Pain   Preliminary Result   Test Reason : ED Triage Standing Order~   Blood Pressure :   */*   mmHG   Vent. Rate :  58 BPM     Atrial Rate :  58 BPM      P-R Int : 202 ms          QRS Dur : 102 ms       QT Int : 444 ms       P-R-T Axes :  61  -7  -7 degrees     QTcB Int : 435 ms      Sinus bradycardia   Minimal voltage criteria for LVH, may be normal variant   Septal infarct , age undetermined   Abnormal ECG   When compared with ECG of 15-Feb-2025 21:48, (Unconfirmed)   premature atrial complexes are no longer present   Nonspecific T wave abnormality now evident in Lateral leads      Referred By: edmd           Confirmed By:       ECG 12 Lead ED Triage Standing Order; Chest Pain   Preliminary Result   Test Reason : ED Triage Standing Order~   Blood Pressure :   */*   mmHG   Vent. Rate :  59 BPM     Atrial Rate :  59 BPM      P-R Int : 202 ms          QRS Dur : 102 ms       QT Int : 436 ms       P-R-T Axes :  30 -10  30 degrees     QTcB Int : 431 ms      Sinus bradycardia with premature atrial  complexes   Otherwise normal ECG   When compared with ECG of 20-Oct-2024 00:44,   premature atrial complexes are now present      Referred By: edmd           Confirmed By:               Final diagnoses:   Near syncope       ED Disposition  ED Disposition       ED Disposition   Discharge    Condition   Stable    Comment   --               [facility]  [address]  [address]  [facility] [id number] [phone]  Call in 1 day      Nargis Rosenthal MD  [address]  [address]  [place] [id number]  [phone]    Call in 1 week  your primary care provider         Medication List      No changes were made to your prescriptions during this visit.            Gisselle Gaston MD  02/17/25 [id number]

## 2025-02-17 LAB
QT INTERVAL: 436 MS
QT INTERVAL: 444 MS
QTC INTERVAL: 431 MS
QTC INTERVAL: 435 MS

## 2025-02-28 ENCOUNTER — OFFICE VISIT (OUTPATIENT)
Dept: CARDIOLOGY | Facility: HOSPITAL | Age: 80
End: 2025-02-28
Payer: MEDICARE

## 2025-02-28 ENCOUNTER — HOSPITAL ENCOUNTER (OUTPATIENT)
Dept: CARDIOLOGY | Facility: HOSPITAL | Age: 80
Discharge: HOME | End: 2025-02-28
Payer: MEDICARE

## 2025-02-28 VITALS
HEART RATE: 68 BPM | BODY MASS INDEX: 28.65 KG/M2 | HEIGHT: 75 IN | OXYGEN SATURATION: 95 % | DIASTOLIC BLOOD PRESSURE: 61 MMHG | WEIGHT: 230.4 LBS | RESPIRATION RATE: 18 BRPM | SYSTOLIC BLOOD PRESSURE: 127 MMHG

## 2025-02-28 DIAGNOSIS — R55 SYNCOPE AND COLLAPSE: Primary | ICD-10-CM

## 2025-02-28 DIAGNOSIS — R55 SYNCOPE AND COLLAPSE: ICD-10-CM

## 2025-02-28 DIAGNOSIS — R00.1 BRADYCARDIA: ICD-10-CM

## 2025-02-28 DIAGNOSIS — R07.89 CHEST DISCOMFORT: ICD-10-CM
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
## 2025-02-28 NOTE — PROGRESS NOTES
[facility] Heart Monitor Documentation    Chris Velásquez  1945  [id number]  02/28/25      [] ZIO XT Patch  Model [id number] Prescribed for  Days    Serial Number: (N + 9 Digits) N  Apply-By Date on Box:   USPS Tracking Number:   USPS Tracking        [x] [employer] MINI PLUS Mobile Cardiac Telemetry  Model BGMINIPLUS Prescribed for 30 Days    Serial Number: (BGM + 7 Digits) [id number]  Shipped-By Date on Box: 02/15/2025  UPS Tracking Number: [id number]  UPS Tracking      [] [employer] MINI Holter Monitor  Model BGMINIEL Prescribed for  Days    Serial Number: (7 Digits)   Shipped-By Date on Box:   UPS Tracking Number: [id number]  UPS Tracking        This monitor was applied to the patient's chest and checked for proper functioning.  Mr. Chris Velásquez was instructed in the proper use of this monitor.  He was given the opportunity to ask questions and left the office with the device 's instruction manual.    Fern Dunn CMA, 10:05 EST, 02/28/25                  [facility]

## 2025-03-06 ENCOUNTER — OFFICE VISIT (OUTPATIENT)
Dept: INTERNAL MEDICINE | Facility: CLINIC | Age: 80
End: 2025-03-06
Payer: MEDICARE

## 2025-03-06 VITALS
SYSTOLIC BLOOD PRESSURE: 104 MMHG | BODY MASS INDEX: 28.6 KG/M2 | OXYGEN SATURATION: 98 % | TEMPERATURE: 97.3 F | DIASTOLIC BLOOD PRESSURE: 68 MMHG | WEIGHT: 230 LBS | HEIGHT: 75 IN | RESPIRATION RATE: 16 BRPM | HEART RATE: 52 BPM

## 2025-03-06 DIAGNOSIS — R55 NEAR SYNCOPE: Primary | ICD-10-CM

## 2025-03-06 PROCEDURE — 1160F RVW MEDS BY RX/DR IN RCRD: CPT | Performed by: INTERNAL MEDICINE

## 2025-03-06 PROCEDURE — 99213 OFFICE O/P EST LOW 20 MIN: CPT | Performed by: INTERNAL MEDICINE

## 2025-03-06 PROCEDURE — 1159F MED LIST DOCD IN RCRD: CPT | Performed by: INTERNAL MEDICINE

## 2025-03-06 PROCEDURE — 1126F AMNT PAIN NOTED NONE PRSNT: CPT | Performed by: INTERNAL MEDICINE

## 2025-03-06 NOTE — PROGRESS NOTES
Answers submitted by the patient for this visit:  Problem not listed (Submitted on 2/27/2025)  Chief Complaint: Other medical problem  Reason for appointment: ER visit follow-up  abdominal pain: No  anorexia: No  joint pain: No  change in stool: No  chest pain: No  chills: No  nasal congestion: No  cough: No  diaphoresis: No  fatigue: No  fever: No  headaches: No  joint swelling: No  myalgias: No  nausea: No  neck pain: No  numbness: No  rash: No  sore throat: No  swollen glands: No  dysuria: No  vertigo: No  visual change: No  vomiting: No  weakness: No  Medications tried: none  Chief Complaint  ER follow up  (2/15/2025 - 2/16/2025 (4 hours)/[facility] EMERGENCY DEPARTMENT)    Subjective          Chris Velásquez presents to [facility]    Patient here with his wife  for ER follow-up.  He was seen in the ER on 2/15/2025 for near syncopal episode.  He followed up with cardiology on 2/28/25 and has a 4 week event monitor on  Initial episode was back in 2018 and did wear heart monitor then as well; was felt to be vasovagal  on day of ER visit, Patient had dinner and was watching a movie; got up to go to bathroom and felt fine, came back and sat down, but felt chilled and unwell, and decided to go lay down. On way to bedroom, he felt dizzy and started to collapse and wife helped break fall, so did not hurt himself.  He does think he might of been out for a few seconds.  Remembers being on the ground.  EMS came and found his heart rate to be in the 40s, systolic blood pressure in the 80s.  Symptoms improved with IV fluids given to him in the ambulance.  Patient felt better after he got the fluids and felt back to normal in the ER.  Blood work done in the ED was essentially negative except slightly low calcium and albumin  Patient has had some recent stresses which they feel may be contributing- needs Mohs again his nose, some legal issues w/ MARISABEL    He has biked since the  "episode and felt ok-no chest pain, no recurrence of dizziness or syncope    Current Outpatient Medications:     cholecalciferol (VITAMIN D3) 1000 units tablet, Take 2 tablets by mouth Daily., Disp: , Rfl:    The following portions of the patient's history were reviewed and updated as appropriate: allergies, current medications, past family history, past medical history, past social history, past surgical history and problem list.     Objective   Vital Signs:   /68 (BP Location: Right arm, Patient Position: Sitting, Cuff Size: Adult)   Pulse 52   Temp 97.3 °F (36.3 °C) (Infrared)   Resp 16   Ht 190.5 cm (75\")   Wt 104 kg (230 lb)   SpO2 98%   BMI 28.75 kg/m²       Physical exam  Constitutional: oriented to person, place, and time.  well-developed and well-nourished. No distress.   HENT:   Head: Normocephalic and atraumatic.   Eyes: Conjunctivae and EOM are normal.   Cardiovascular: Normal rate, regular rhythm and normal heart sounds.  Exam reveals no gallop and no friction rub.    No murmur heard.  Pulmonary/Chest: Effort normal and breath sounds normal. No respiratory distress.   no wheezes.   Neurological:  alert and oriented to person, place, and time.   Skin: Skin is warm and dry. not diaphoretic.   Psychiatric:  normal mood and affect. behavior is normal. Judgment and thought content normal.      Physical Exam     Result Review :                    Lab Results   Component Value Date    WBC 5.07 02/15/2025    HGB 12.9 (L) 02/15/2025    HCT 39.8 02/15/2025    MCV 93.9 02/15/2025     02/15/2025     Lab Results   Component Value Date    GLUCOSE 111 (H) 02/15/2025    BUN 17 02/15/2025    CREATININE 0.75 (L) 02/15/2025     02/15/2025    K 3.7 02/15/2025     02/15/2025    CALCIUM 8.4 (L) 02/15/2025    PROTEINTOT 5.8 (L) 02/15/2025    ALBUMIN 3.8 02/15/2025    ALT 18 02/15/2025    AST 22 02/15/2025    ALKPHOS 76 02/15/2025    BILITOT 0.3 02/15/2025    GLOB 2.0 02/15/2025    AGRATIO 1.9 " "02/15/2025    BCR 22.7 02/15/2025    ANIONGAP 9.0 02/15/2025    EGFR 92.9 02/15/2025     Lab Results   Component Value Date    CHOL 183 08/14/2024    TRIG 76 08/14/2024    HDL 53 08/14/2024     (H) 08/14/2024     No results found for: \"HGBA1C\"  Lab Results   Component Value Date    TSH 2.210 08/14/2024     No results found for: \"ABYR472\"         Assessment and Plan      Diagnoses and all orders for this visit:    1. Near syncope (Primary)    Already drinking plenty and wearing compression stockings  He will increase his salt intake - could do a salted snack or electrolyte drink every day  We will Follow-up on his heart monitor results and he has follow-up with cardiology as well        Follow Up   No follow-ups on file.  Patient was given instructions and counseling regarding his condition or for health maintenance advice. Please see specific information pulled into the AVS if appropriate.   "

## 2025-03-07 ENCOUNTER — TELEPHONE (OUTPATIENT)
Dept: CARDIOLOGY | Facility: HOSPITAL | Age: 80
End: 2025-03-07
Payer: MEDICARE
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
## 2025-03-10 ENCOUNTER — TELEPHONE (OUTPATIENT)
Dept: CARDIOLOGY | Facility: HOSPITAL | Age: 80
End: 2025-03-10
Payer: MEDICARE
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
## 2025-03-11 ENCOUNTER — TELEPHONE (OUTPATIENT)
Dept: CARDIOLOGY | Facility: HOSPITAL | Age: 80
End: 2025-03-11
Payer: MEDICARE

## 2025-03-12 ENCOUNTER — TELEPHONE (OUTPATIENT)
Dept: CARDIOLOGY | Facility: HOSPITAL | Age: 80
End: 2025-03-12
Payer: MEDICARE

## 2025-03-14 ENCOUNTER — TELEPHONE (OUTPATIENT)
Dept: CARDIOLOGY | Facility: HOSPITAL | Age: 80
End: 2025-03-14
Payer: MEDICARE

## 2025-03-14 ENCOUNTER — TELEPHONE (OUTPATIENT)
Dept: CARDIOLOGY | Facility: HOSPITAL | Age: 80
End: 2025-03-14

## 2025-03-14 DIAGNOSIS — R55 SYNCOPE AND COLLAPSE: Primary | ICD-10-CM

## 2025-03-14 DIAGNOSIS — I47.29 VENTRICULAR TACHYCARDIA (PAROXYSMAL): ICD-10-CM

## 2025-03-14 DIAGNOSIS — R07.89 CHEST DISCOMFORT: ICD-10-CM

## 2025-03-14 NOTE — TELEPHONE ENCOUNTER
"  Caller: Chris Velásquez \"Bill\"    Relationship: Self    Best call back number: [phone]     What is the best time to reach you: ANY    Who are you requesting to speak with (clinical staff, provider,  specific staff member): MJ        What was the call regarding: PATIENT IS REQUESTING A CALL BACK FROM MJ IN REGARDS TO THE FINDINGS ON HIS RHYTHM STRIPS. PATIENT STATES HE HAS MORE QUESTIONS. PLEASE CALL  BACK AT YOUR EARLIEST CONVENIENCE.    Is it okay if the provider responds through [employer]: PLEASE CALL    "

## 2025-03-14 NOTE — TELEPHONE ENCOUNTER
Please call patient.  We received more rhythm strips from his heart monitor showing an irregular heart rhythm called ventricular tachycardia.  We saw brief episode of this on a previous strip, but they have no other strip lasted longer in duration.  When we are seeing with this particular rhythm we do need to make sure there is not any underlying heart disease.  We will be scheduling a stress test as well as the echocardiogram previously ordered.  With his history of Charcot-Ashley-Tooth disease and his difficulty ambulating, test.  Myocardial perfusion stress test (not ACE treadmill stress test).  Please review the stress test results with him.  He will be called to schedule his test.

## 2025-03-17 ENCOUNTER — TELEPHONE (OUTPATIENT)
Dept: CARDIOLOGY | Facility: HOSPITAL | Age: 80
End: 2025-03-17
Payer: MEDICARE

## 2025-03-20 NOTE — TELEPHONE ENCOUNTER
Reviewed.  Cardiac pauses, nocturnal noted.  Previously noted.  Testing has been scheduled.  Cardiac follow-up scheduled.  No new arrhythmia noted.

## 2025-03-21 ENCOUNTER — HOSPITAL ENCOUNTER (OUTPATIENT)
Dept: CARDIOLOGY | Facility: HOSPITAL | Age: 80
Discharge: HOME | End: 2025-03-21
Payer: MEDICARE

## 2025-03-21 VITALS — BODY MASS INDEX: 28.51 KG/M2 | HEIGHT: 75 IN | WEIGHT: 229.28 LBS

## 2025-03-21 DIAGNOSIS — I47.29 NSVT (NONSUSTAINED VENTRICULAR TACHYCARDIA): ICD-10-CM

## 2025-03-21 DIAGNOSIS — I45.5 SINUS PAUSE: ICD-10-CM

## 2025-03-21 DIAGNOSIS — R55 SYNCOPE AND COLLAPSE: ICD-10-CM

## 2025-03-21 LAB
AORTIC DIMENSIONLESS INDEX: 0.79 (DI)
AV MEAN PRESS GRAD SYS DOP V1V2: 4 MMHG
AV VMAX SYS DOP: 132 CM/SEC
BH CV ECHO MEAS - AO MAX PG: 7 MMHG
BH CV ECHO MEAS - AO ROOT DIAM: 3.5 CM
BH CV ECHO MEAS - AO V2 VTI: 30.1 CM
BH CV ECHO MEAS - AVA(I,D): 2.49 CM2
BH CV ECHO MEAS - EDV(CUBED): 79.5 ML
BH CV ECHO MEAS - EDV(MOD-SP2): 63.6 ML
BH CV ECHO MEAS - EDV(MOD-SP4): 75.7 ML
BH CV ECHO MEAS - EF(MOD-SP2): 60.7 %
BH CV ECHO MEAS - EF(MOD-SP4): 61 %
BH CV ECHO MEAS - ESV(CUBED): 24.4 ML
BH CV ECHO MEAS - ESV(MOD-SP2): 25 ML
BH CV ECHO MEAS - ESV(MOD-SP4): 29.5 ML
BH CV ECHO MEAS - FS: 32.6 %
BH CV ECHO MEAS - IVS/LVPW: 0.91 CM
BH CV ECHO MEAS - IVSD: 1 CM
BH CV ECHO MEAS - LAT PEAK E' VEL: 12.6 CM/SEC
BH CV ECHO MEAS - LV DIASTOLIC VOL/BSA (35-75): 32.5 CM2
BH CV ECHO MEAS - LV MASS(C)D: 152.6 GRAMS
BH CV ECHO MEAS - LV MAX PG: 4.5 MMHG
BH CV ECHO MEAS - LV MEAN PG: 2 MMHG
BH CV ECHO MEAS - LV SYSTOLIC VOL/BSA (12-30): 12.7 CM2
BH CV ECHO MEAS - LV V1 MAX: 106 CM/SEC
BH CV ECHO MEAS - LV V1 VTI: 23.8 CM
BH CV ECHO MEAS - LVIDD: 4.3 CM
BH CV ECHO MEAS - LVIDS: 2.9 CM
BH CV ECHO MEAS - LVOT AREA: 3.1 CM2
BH CV ECHO MEAS - LVOT DIAM: 2 CM
BH CV ECHO MEAS - LVPWD: 1.1 CM
BH CV ECHO MEAS - MED PEAK E' VEL: 6.9 CM/SEC
BH CV ECHO MEAS - MV A MAX VEL: 51.9 CM/SEC
BH CV ECHO MEAS - MV DEC SLOPE: 244 CM/SEC2
BH CV ECHO MEAS - MV DEC TIME: 0.35 SEC
BH CV ECHO MEAS - MV E MAX VEL: 51.4 CM/SEC
BH CV ECHO MEAS - MV E/A: 0.99
BH CV ECHO MEAS - MV MAX PG: 2.7 MMHG
BH CV ECHO MEAS - MV MEAN PG: 1 MMHG
BH CV ECHO MEAS - MV P1/2T: 90.5 MSEC
BH CV ECHO MEAS - MV V2 VTI: 30.7 CM
BH CV ECHO MEAS - MVA(P1/2T): 2.43 CM2
BH CV ECHO MEAS - MVA(VTI): 2.44 CM2
BH CV ECHO MEAS - PA ACC TIME: 0.17 SEC
BH CV ECHO MEAS - SV(LVOT): 74.8 ML
BH CV ECHO MEAS - SV(MOD-SP2): 38.6 ML
BH CV ECHO MEAS - SV(MOD-SP4): 46.2 ML
BH CV ECHO MEAS - SVI(LVOT): 32.1 ML/M2
BH CV ECHO MEAS - SVI(MOD-SP2): 16.6 ML/M2
BH CV ECHO MEAS - SVI(MOD-SP4): 19.8 ML/M2
BH CV ECHO MEAS - TAPSE (>1.6): 2.11 CM
BH CV ECHO MEAS - TR MAX PG: 24.1 MMHG
BH CV ECHO MEAS - TR MAX VEL: 244.8 CM/SEC
BH CV ECHO MEASUREMENTS AVERAGE E/E' RATIO: 5.27
BH CV VAS BP RIGHT ARM: NORMAL MMHG
BH CV XLRA - RV BASE: 4 CM
BH CV XLRA - RV LENGTH: 7.2 CM
BH CV XLRA - RV MID: 3.3 CM
BH CV XLRA - TDI S': 11 CM/SEC
IVRT: 102 MS
LEFT ATRIUM VOLUME INDEX: 27.9 ML/M2
LV EF BIPLANE MOD: 60.6 %

## 2025-03-21 PROCEDURE — 93306 TTE W/DOPPLER COMPLETE: CPT

## 2025-03-25 ENCOUNTER — HOSPITAL ENCOUNTER (OUTPATIENT)
Dept: CARDIOLOGY | Facility: HOSPITAL | Age: 80
Discharge: HOME | End: 2025-03-25
Payer: MEDICARE

## 2025-03-25 VITALS — BODY MASS INDEX: 28.51 KG/M2 | WEIGHT: 229.28 LBS | HEIGHT: 75 IN

## 2025-03-25 DIAGNOSIS — R55 SYNCOPE AND COLLAPSE: ICD-10-CM

## 2025-03-25 DIAGNOSIS — R07.89 CHEST DISCOMFORT: ICD-10-CM

## 2025-03-25 DIAGNOSIS — I47.29 VENTRICULAR TACHYCARDIA (PAROXYSMAL): ICD-10-CM

## 2025-03-25 LAB
BH CV REST NUCLEAR ISOTOPE DOSE: 9.9 MCI
BH CV STRESS BP STAGE 2: NORMAL
BH CV STRESS BP STAGE 3: NORMAL
BH CV STRESS COMMENTS STAGE 1: NORMAL
BH CV STRESS DOSE REGADENOSON STAGE 1: 0.4
BH CV STRESS DURATION MIN STAGE 1: 1
BH CV STRESS DURATION MIN STAGE 2: 1
BH CV STRESS DURATION MIN STAGE 3: 1
BH CV STRESS DURATION MIN STAGE 4: 1
BH CV STRESS DURATION SEC STAGE 1: 0
BH CV STRESS DURATION SEC STAGE 2: 0
BH CV STRESS DURATION SEC STAGE 3: 0
BH CV STRESS DURATION SEC STAGE 4: 0
BH CV STRESS HR STAGE 1: 51
BH CV STRESS HR STAGE 2: 81
BH CV STRESS HR STAGE 3: 67
BH CV STRESS HR STAGE 4: 64
BH CV STRESS NUCLEAR ISOTOPE DOSE: 33 MCI
BH CV STRESS O2 STAGE 1: 100
BH CV STRESS O2 STAGE 2: 100
BH CV STRESS O2 STAGE 3: 100
BH CV STRESS O2 STAGE 4: 100
BH CV STRESS PROTOCOL 1: NORMAL
BH CV STRESS RECOVERY BP: NORMAL MMHG
BH CV STRESS RECOVERY HR: 67 BPM
BH CV STRESS RECOVERY O2: 100 %
BH CV STRESS STAGE 1: 1
BH CV STRESS STAGE 2: 2
BH CV STRESS STAGE 3: 3
BH CV STRESS STAGE 4: 4
MAXIMAL PREDICTED HEART RATE: 143 BPM
PERCENT MAX PREDICTED HR: 58.04 %
SPECT HRT GATED+EF W RNC IV: 76 %
STRESS BASELINE BP: NORMAL MMHG
STRESS BASELINE HR: 46 BPM
STRESS O2 SAT REST: 100 %
STRESS PERCENT HR: 68 %
STRESS POST ESTIMATED WORKLOAD: 1 METS
STRESS POST EXERCISE DUR MIN: 4 MIN
STRESS POST EXERCISE DUR SEC: 0 SEC
STRESS POST O2 SAT PEAK: 100 %
STRESS POST PEAK BP: NORMAL MMHG
STRESS POST PEAK HR: 83 BPM
STRESS TARGET HR: 122 BPM

## 2025-03-25 PROCEDURE — 25010000002 REGADENOSON 0.4 MG/5ML SOLUTION: Performed by: NURSE PRACTITIONER

## 2025-03-25 PROCEDURE — 93018 CV STRESS TEST I&R ONLY: CPT | Performed by: INTERNAL MEDICINE

## 2025-03-25 PROCEDURE — A9500 TC99M SESTAMIBI: HCPCS | Performed by: NURSE PRACTITIONER

## 2025-03-25 PROCEDURE — 34310000005 TECHNETIUM SESTAMIBI: Performed by: NURSE PRACTITIONER

## 2025-03-25 PROCEDURE — 93017 CV STRESS TEST TRACING ONLY: CPT

## 2025-03-25 PROCEDURE — 93016 CV STRESS TEST SUPVJ ONLY: CPT | Performed by: INTERNAL MEDICINE

## 2025-03-25 PROCEDURE — 78452 HT MUSCLE IMAGE SPECT MULT: CPT | Performed by: INTERNAL MEDICINE

## 2025-03-25 PROCEDURE — 78452 HT MUSCLE IMAGE SPECT MULT: CPT

## 2025-03-25 RX ORDER — REGADENOSON 0.08 MG/ML
0.4 INJECTION, SOLUTION INTRAVENOUS ONCE
Status: COMPLETED | OUTPATIENT
Start: 2025-03-25 | End: 2025-03-25

## 2025-03-25 RX ADMIN — TECHNETIUM TC 99M SESTAMIBI 1 DOSE: 1 INJECTION INTRAVENOUS at 08:50

## 2025-03-25 RX ADMIN — TECHNETIUM TC 99M SESTAMIBI 1 DOSE: 1 INJECTION INTRAVENOUS at 07:19

## 2025-03-25 RX ADMIN — REGADENOSON 0.4 MG: 0.08 INJECTION, SOLUTION INTRAVENOUS at 08:49

## 2025-03-28 ENCOUNTER — OFFICE VISIT (OUTPATIENT)
Dept: CARDIOLOGY | Facility: CLINIC | Age: 80
End: 2025-03-28
Payer: MEDICARE

## 2025-03-28 VITALS
BODY MASS INDEX: 28.25 KG/M2 | WEIGHT: 232 LBS | HEIGHT: 76 IN | SYSTOLIC BLOOD PRESSURE: 124 MMHG | HEART RATE: 54 BPM | OXYGEN SATURATION: 98 % | DIASTOLIC BLOOD PRESSURE: 76 MMHG

## 2025-03-28 DIAGNOSIS — I95.1 ORTHOSTATIC HYPOTENSION: ICD-10-CM

## 2025-03-28 DIAGNOSIS — R55 VASOVAGAL SYNCOPE: Primary | ICD-10-CM

## 2025-03-28 DIAGNOSIS — G45.9 TIA (TRANSIENT ISCHEMIC ATTACK): ICD-10-CM

## 2025-03-28 NOTE — PROGRESS NOTES
Date of Hospital Visit: 25  Encounter Provider: Amor Meadows MD  Place of Service: [facility]  Patient Name: Chris Velásquez  :1945  Referral Provider: No ref. provider found  Primary Care Provider: Nargis Rosenthal MD    Chief complaint/Reason for Consultation: Syncope    Problem List:  Syncope and collapse  A. Echo, 2018: EF 65%.  B. Cardiac monitor, 2018: SR. 2 brief runs of NSVT.   C. MPS, 10/23/2018: EF 56%. No evidence of ischemia. Treadmill score of 5.0   D. MCOT, 2025: SR with PACs.   E. Echo, 2025: EF 56-60%. Grade I impaired relaxation. Aortic sclerosis w/o aortic stenosis/regurgiation. Mild MR. Mild TR with normal RVSP.   F. MPS, 2025: No evidence of ischemia.   History of brief NSVT  Negative echo and stress test x 2 (see above).  Chronic low blood pressure  Surgical history as listed below.    History of Present Illness:  The patient is a pleasant 79-year-old retired  white male.  He presents here for follow-up evaluation of syncope that appears to be vasovagal in etiology.  Patient reports that he had an episode in 2018 when he was watching television and states that after seeing a liver operation and blood on TV he felt queasy and momentarily passed out.  Subsequently underwent cardiac evaluation that included unremarkable echocardiogram and Cardiolite stress test.  On cardiac monitor short NSVT was noted.  Since then he has done well up until last month.  States that 1 day he was sitting on couch and started to experience queasiness and discomfort in his left side extending from lower abdomen upwards into the chest.  He did not feel right.  He did not have any anterior chest pain shortness of breath palpitations.  He decided to walk over to his bedroom to rest and on door of the bedroom which was about 30 feet away he felt intensely dizzy and collapsed.  He did not lose consciousness.  His wife responded immediately and made  him stay on the floor.  EMS were called and he was brought to the emergency department.  Workup in the ER was negative.  Subsequently he was evaluated by chest pain clinic and had a repeat echo and stress test which were normal.  He is currently wearing a cardiac monitor.  Patient states that he was a regular blood donor and has donated more than 100 times in his lifetime.  States that his blood pressure has always been low in the 110 range and sometimes even a bit under 100.  He does not have significant dyslipidemia.  States that his father had carotid artery disease and needed surgery.    Past Medical History:   Diagnosis Date    Abnormal ECG 2/15/25    60 BPM while at [facility] ER    Allergic 1979    penicillin    Arthritis     Charcot-Ashley-Tooth disease     Fractures     as a child broke his arm on the left    Hearing difficulty     has hearing aids 2023    HL (hearing loss) 2005    Hearing test at work, industrial environment    Hypotension     Lung nodule     Neuropathy     feet    Vasovagal syncope        Past Surgical History:   Procedure Laterality Date    ANAL FISSURECTOMY  1992    CLAVICLE SURGERY  1973    A/C separation    COLONOSCOPY  07/22/2020    Dr. Downing    JOINT REPLACEMENT  2019    knee replacement (2)    REPLACEMENT TOTAL KNEE Left 10/11/2019    [facility]    REPLACEMENT TOTAL KNEE Right 12/10/2019    [facility]    ROTATOR CUFF REPAIR Right 10/01/2014    [facility]    SHOULDER SURGERY Right 03/04/2015    surgery after rotator cuff b/c something tore loose       (Not in a hospital admission)      Social History     Socioeconomic History    Marital status:    Tobacco Use    Smoking status: Never     Passive exposure: Past    Smokeless tobacco: Never   Vaping Use    Vaping status: Never Used   Substance and Sexual Activity    Alcohol use: Yes     Alcohol/week: 10.0 standard drinks of alcohol     Types: 10 Glasses of wine per week     Comment: 2 glasses, 5 days a week    Drug use: No     "Sexual activity: Not Currently     Partners: Female     Birth control/protection: None       Family History   Problem Relation Age of Onset    Alcohol abuse Mother         alcoholic    Liver disease Mother         alcoholic    Migraines Mother         stopped after hysterectomy    Cervical cancer Mother     Cancer Mother         Uterine    Alcohol abuse Father         problem from breathing alcohol at work, very light drinker    Liver disease Father         from breathing alcohol at work    Heart attack Father         primary cause believed to be liver failure    Arthritis Father     Cirrhosis Maternal Grandmother     Alcohol abuse Maternal Grandmother         Alcoholic    Liver disease Maternal Grandmother          from it    Alcohol abuse Maternal Grandfather         heavy drinker    No Known Problems Paternal Grandmother     No Known Problems Paternal Grandfather        REVIEW OF SYSTEMS:     12 point ROS was performed and is Negative except as outlined in HPI     Objective:     Vitals:    25   BP: 124/76   BP Location: Right arm   Patient Position: Sitting   Cuff Size: Adult   Pulse: 54   SpO2: 98%   Weight: 105 kg (232 lb)   Height: 193 cm (76\")     Body mass index is 28.24 kg/m².  Flowsheet Rows      Flowsheet Row First Filed Value   Admission Height 193 cm (76\") Documented at 2025   Admission Weight 105 kg (232 lb) Documented at 2025 08            Physical Exam   General: No acute distress, well developed and well nourished.    Skin: Skin is warm and dry. No obvious cyanosis, erythema or pallor.   HEENT: Atraumatic, normocephalic, no conjunctival pallor, no scleral icterus.   Neck: Supple, no JVD. Normal carotid upstrokes, no bruits.    Chest:No respiratory distress. No chest wall tenderness. Breath sounds are normal. No wheezes, rhonchi or rales.  Cardiovascular: Normal S1 and S2, no murmur, gallop or rub. PMI is not displaced.    Pulses:Radial and pedal pulses are 2+ and " symmetric.    Abdomen: Soft, nontender, normal bowel sounds.   Musculoskeletal/Extremities:  No clubbing, cyanosis or edema. No gross deformity.   Neurological: Alert and oriented to person, place, and time, no gross focal deficits.   Psychiatric: Normal mood and affect.Speech and behavior is normal.    Lab Review:         Labs reviewed, no significant abnormalities.           ECG 12 Lead    Date/Time: 3/28/2025 9:00 AM  Performed by: Amor Meadows MD    Authorized by: Amor Meadows MD  Comparison: compared with previous ECG from 2/17/2025  Rhythm: sinus bradycardia  Conduction: 1st degree AV block    Clinical impression: non-specific ECG    Assessment:  1. Vasovagal syncope     2. Orthostatic hypotension     3. TIA (transient ischemic attack)       Recommendations:   His symptoms most probably vasovagal in nature as he had abdominal discomfort and collapse while in upright position.  Workup so far has included unremarkable echo and Cardiolite stress test.  He is currently wearing a cardiac monitor which will be reviewed.  Due to concern about TIA we will obtain a carotid duplex.  Patient was counseled about prevention of future episodes.  I explained that if he were ever to feel bad instead of getting up to walk over to another place he should stay there until his symptoms resolved.  On a daily basis is advised to maintain good hydration.  In the absence of any recurrence of symptoms since the episode in February I do not think tilt table test will be of any yield however after review of monitor and if the carotid duplex is negative and in case of recurrent symptoms we may consider a tilt table test and a formal electrophysiology evaluation.  Follow-up in 6 weeks.  Thank you for this consultation.    Amor Meadows MD, FAC, [facility]

## 2025-04-29 ENCOUNTER — HOSPITAL ENCOUNTER (OUTPATIENT)
Dept: CARDIOLOGY | Facility: HOSPITAL | Age: 80
Discharge: HOME | End: 2025-04-29
Admitting: INTERNAL MEDICINE
Payer: MEDICARE

## 2025-04-29 DIAGNOSIS — R55 VASOVAGAL SYNCOPE: ICD-10-CM

## 2025-04-29 PROCEDURE — 93880 EXTRACRANIAL BILAT STUDY: CPT

## 2025-04-30 LAB
BH CV XLRA MEAS LEFT DIST CCA EDV: 29.3 CM/SEC
BH CV XLRA MEAS LEFT DIST CCA PSV: 96.5 CM/SEC
BH CV XLRA MEAS LEFT DIST ICA EDV: 37.1 CM/SEC
BH CV XLRA MEAS LEFT DIST ICA PSV: 91.9 CM/SEC
BH CV XLRA MEAS LEFT ICA/CCA RATIO: 1.01
BH CV XLRA MEAS LEFT MID CCA EDV: 27.8 CM/SEC
BH CV XLRA MEAS LEFT MID CCA PSV: 104.2 CM/SEC
BH CV XLRA MEAS LEFT MID ICA EDV: 33.2 CM/SEC
BH CV XLRA MEAS LEFT MID ICA PSV: 105.8 CM/SEC
BH CV XLRA MEAS LEFT PROX CCA EDV: 38.6 CM/SEC
BH CV XLRA MEAS LEFT PROX CCA PSV: 155.9 CM/SEC
BH CV XLRA MEAS LEFT PROX ECA EDV: 30.9 CM/SEC
BH CV XLRA MEAS LEFT PROX ECA PSV: 166 CM/SEC
BH CV XLRA MEAS LEFT PROX ICA EDV: 23.8 CM/SEC
BH CV XLRA MEAS LEFT PROX ICA PSV: 67.2 CM/SEC
BH CV XLRA MEAS LEFT PROX SCLA PSV: 104.7 CM/SEC
BH CV XLRA MEAS LEFT VERTEBRAL A EDV: 15.4 CM/SEC
BH CV XLRA MEAS LEFT VERTEBRAL A PSV: 52.5 CM/SEC
BH CV XLRA MEAS RIGHT DIST CCA EDV: 27.8 CM/SEC
BH CV XLRA MEAS RIGHT DIST CCA PSV: 94.2 CM/SEC
BH CV XLRA MEAS RIGHT DIST ICA EDV: 23.9 CM/SEC
BH CV XLRA MEAS RIGHT DIST ICA PSV: 65 CM/SEC
BH CV XLRA MEAS RIGHT ICA/CCA RATIO: 0.72
BH CV XLRA MEAS RIGHT MID CCA EDV: 30.1 CM/SEC
BH CV XLRA MEAS RIGHT MID CCA PSV: 99.6 CM/SEC
BH CV XLRA MEAS RIGHT MID ICA EDV: 23.2 CM/SEC
BH CV XLRA MEAS RIGHT MID ICA PSV: 71.8 CM/SEC
BH CV XLRA MEAS RIGHT PROX CCA EDV: 20.1 CM/SEC
BH CV XLRA MEAS RIGHT PROX CCA PSV: 152.8 CM/SEC
BH CV XLRA MEAS RIGHT PROX ECA EDV: 17 CM/SEC
BH CV XLRA MEAS RIGHT PROX ECA PSV: 91.9 CM/SEC
BH CV XLRA MEAS RIGHT PROX ICA EDV: 21.6 CM/SEC
BH CV XLRA MEAS RIGHT PROX ICA PSV: 65.6 CM/SEC
BH CV XLRA MEAS RIGHT PROX SCLA PSV: 98.3 CM/SEC
BH CV XLRA MEAS RIGHT VERTEBRAL A EDV: 10.3 CM/SEC
BH CV XLRA MEAS RIGHT VERTEBRAL A PSV: 43.5 CM/SEC

## 2025-05-09 NOTE — PROGRESS NOTES
[facility] Cardiology    Encounter Date: 2025    Patient ID: Chris Velásquez is a 79 y.o. male.  : 1945     PCP: Nargis Rosenthal MD       Chief Complaint: Vasovagal syncope      PROBLEM LIST:  Syncope and collapse  Echo, 2018: EF 65%.  Cardiac monitor, 2018: SR. 2 brief runs of NSVT.   MPS, 10/23/2018: EF 56%. No evidence of ischemia. Treadmill score of 5.0   MCOT, 2025: SR with PACs.   Echo, 2025: EF 56-60%. Grade I impaired relaxation. Aortic sclerosis w/o aortic stenosis/regurgiation. Mild MR. Mild TR with normal RVSP.   MPS, 2025: No evidence of ischemia.   Carotid duplex, 2025: MARGARET/LICA normal flow. Bilateral intimal thickening and mild scattered plaque.  History of brief NSVT  Negative echo and stress test x 2 (see above).  Chronic low blood pressure  Surgical history as listed below.    History of Present Illness  Patient presents today for a follow-up with a history of cardiac risk factors. Since last visit, Patient has been doing well from a cardiac standpoint.  Active and busy in daily life, bikes almost every day. Just got back from [facility] and biked 180 miles in 6 days. Had no symptoms with this. Patient denies any chest pain, shortness of air, palpitations, orthopnea, edema, presyncope or syncope.      Allergies   Allergen Reactions    Penicillins Hives         Current Outpatient Medications:     cholecalciferol (VITAMIN D3) 1000 units tablet, Take 2 tablets by mouth Daily., Disp: , Rfl:     The following portions of the patient's history were reviewed and updated as appropriate: allergies, current medications, past family history, past medical history, past social history, past surgical history and problem list.    ROS  Review of Systems   14 point ROS negative except for that listed in the HPI.         Objective:     /70 (BP Location: Right arm, Patient Position: Sitting, Cuff Size: Adult)   Pulse 87   Ht  "190.5 cm (75\")   Wt 103 kg (227 lb)   SpO2 94%   BMI 28.37 kg/m²      Physical Exam  Constitutional: Patient appears well-developed and well-nourished.  Neck: Neck supple. No JVD present.   Cardiovascular: Normal rate, regular rhythm and normal heart sounds. No murmur heard.   2+ symmetric pulses.   Pulmonary/Chest: Breath sounds normal. Does not exhibit tenderness.   Abdominal: Abdomen benign.   Musculoskeletal: Does not exhibit edema.   Vitals reviewed.    Data Review:   Lab Results   Component Value Date    GLUCOSE 111 (H) 02/15/2025    BUN 17 02/15/2025    CREATININE 0.75 (L) 02/15/2025    EGFR 92.9 02/15/2025    BCR 22.7 02/15/2025     02/15/2025    K 3.7 02/15/2025    CO2 27.0 02/15/2025    CALCIUM 8.4 (L) 02/15/2025    ALBUMIN 3.8 02/15/2025    AST 22 02/15/2025    ALT 18 02/15/2025     Lab Results   Component Value Date    CHOL 183 08/14/2024    TRIG 76 08/14/2024    HDL 53 08/14/2024     (H) 08/14/2024      Lab Results   Component Value Date    WBC 5.07 02/15/2025    RBC 4.24 02/15/2025    HGB 12.9 (L) 02/15/2025    HCT 39.8 02/15/2025    MCV 93.9 02/15/2025     02/15/2025     Lab Results   Component Value Date    TSH 2.210 08/14/2024        Procedures       Advance Care Planning   ACP discussion was held with the patient during this visit. Patient has an advance directive (not in EMR), copy requested.           Assessment and plan:      Diagnosis   1. Vasovagal syncope       2. Orthostatic hypotension       3. TIA (transient ischemic attack)           Normal echocardiogram, myocardial perfusion study, M cot, and carotid duplex all of which were reviewed with the patient today.  He denies any recurrent symptoms.  Advised to maintain adequate hydration and increase salt/sodium intake to help prevent orthostatic hypotension.  Continue current medications.   FU in 12 MO, sooner as needed.  Thank you for allowing us to participate in the care of your patient.       Monica Thomas, " PA-C    Please note that portions of this note may have been completed with a voice recognition program. Efforts were made to edit the dictations, but occasionally words are mistranscribed.

## 2025-05-16 ENCOUNTER — OFFICE VISIT (OUTPATIENT)
Dept: CARDIOLOGY | Facility: CLINIC | Age: 80
End: 2025-05-16
Payer: MEDICARE

## 2025-05-16 VITALS
BODY MASS INDEX: 28.23 KG/M2 | HEART RATE: 87 BPM | HEIGHT: 75 IN | DIASTOLIC BLOOD PRESSURE: 70 MMHG | OXYGEN SATURATION: 94 % | WEIGHT: 227 LBS | SYSTOLIC BLOOD PRESSURE: 110 MMHG

## 2025-05-16 DIAGNOSIS — R55 VASOVAGAL SYNCOPE: Primary | ICD-10-CM

## 2025-05-16 DIAGNOSIS — G45.9 TIA (TRANSIENT ISCHEMIC ATTACK): ICD-10-CM

## 2025-05-16 DIAGNOSIS — I95.1 ORTHOSTATIC HYPOTENSION: ICD-10-CM

## 2025-07-15 ENCOUNTER — OFFICE VISIT (OUTPATIENT)
Dept: SLEEP MEDICINE | Facility: CLINIC | Age: 80
End: 2025-07-15
Payer: MEDICARE

## 2025-07-15 VITALS
OXYGEN SATURATION: 97 % | BODY MASS INDEX: 28.23 KG/M2 | DIASTOLIC BLOOD PRESSURE: 76 MMHG | HEART RATE: 83 BPM | HEIGHT: 75 IN | SYSTOLIC BLOOD PRESSURE: 100 MMHG | TEMPERATURE: 98.2 F | WEIGHT: 227 LBS

## 2025-07-15 DIAGNOSIS — E66.3 OVERWEIGHT (BMI 25.0-29.9): ICD-10-CM

## 2025-07-15 DIAGNOSIS — R55 VASOVAGAL SYNCOPE: Primary | ICD-10-CM

## 2025-07-15 DIAGNOSIS — G45.9 TIA (TRANSIENT ISCHEMIC ATTACK): ICD-10-CM

## 2025-07-15 NOTE — PROGRESS NOTES
Chief Complaint:   Chief Complaint   Patient presents with    Sleeping Problem       HPI:    Chris Velásquez is a 79 y.o. male here to establish care.  Patient sees Dr. Rosenthal and George PERKINS for care.  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  Patient has no history of snoring, witnessed apneas, excessive daytime sleepiness, frequent awakenings or morning headaches.  Patient has no history of nasal fracture or head injury.    Patient keeps the same sleep schedule weekend and weekday going to bed at 10:30 PM getting up at 7 AM.  He estimates he is getting 8-1/2 hours of sleep nightly.  He goes to sleep within 15 to 30 minutes and does not usually get up more than 1 time in the night.  He does not nap and is always rested throughout the day.  Patient is very active.  Patient has an [place] score of 1/24.    Social history  This very pleasant 79-year-old male who presents today with his wife.  Patient is retired.  Is a non-smoker and will have an alcoholic beverage 4 or more times a week.  He has 1 regular soda daily.    Family History   Problem Relation Age of Onset    Alcohol abuse Mother         alcoholic    Liver disease Mother         alcoholic    Migraines Mother         stopped after hysterectomy    Cervical cancer Mother     Cancer Mother         Uterine    Alcohol abuse Father         problem from breathing alcohol at work, very light drinker    Liver disease Father          from breathing alcohol at work    Heart attack Father         primary cause believed to be liver failure    Arthritis Father     Cirrhosis Maternal Grandmother     Alcohol abuse Maternal Grandmother         Alcoholic    Liver disease Maternal Grandmother          from it    Alcohol abuse Maternal Grandfather         heavy drinker    No Known Problems Paternal Grandmother     No Known Problems Paternal Grandfather      Past Surgical History:   Procedure Laterality Date    ANAL FISSURECTOMY  1992    CLAVICLE SURGERY  1973    A/C separation    COLONOSCOPY  2020    Dr. Downing    JOINT REPLACEMENT      knee replacement (2)    REPLACEMENT TOTAL KNEE Left 10/11/2019    charissa    REPLACEMENT TOTAL KNEE Right 12/10/2019    davidson    ROTATOR CUFF REPAIR Right 10/01/2014    Davy    SHOULDER SURGERY Right 2015    surgery after rotator cuff b/c something tore loose           Current medications are:   Current Outpatient Medications:     cholecalciferol (VITAMIN D3) 1000 units tablet, Take 2 tablets by mouth Daily., Disp: , Rfl: .      The patient's relevant past medical, surgical, family and social history were reviewed and updated in Epic as appropriate.       Review of Systems   HENT:  Positive for hearing loss and tinnitus.    Genitourinary:  Positive for difficulty urinating, frequency and urgency.   Musculoskeletal:  Positive for arthralgias, back pain and gait problem.   Neurological:  Positive for syncope.   All other systems reviewed and are negative.        Objective:    Physical Exam  Constitutional:       Appearance: Normal appearance.   HENT:      Head: Normocephalic and atraumatic.      Mouth/Throat:      Mouth: Mucous membranes are moist.      Pharynx: Oropharynx is clear.      Comments: Mallampati 1 anatomy  Cardiovascular:      Rate and Rhythm: Normal rate and regular rhythm.   Pulmonary:      Effort: Pulmonary effort is normal.      Breath sounds: Normal breath sounds.  "  Skin:     General: Skin is warm and dry.   Neurological:      Mental Status: He is alert and oriented to person, place, and time.   Psychiatric:         Mood and Affect: Mood normal.         Behavior: Behavior normal.         Thought Content: Thought content normal.         Judgment: Judgment normal.       /76   Pulse 83   Temp 98.2 °F (36.8 °C)   Ht 190.5 cm (75\")   Wt 103 kg (227 lb)   SpO2 97%   BMI 28.37 kg/m²       ASSESSMENT/PLAN    Diagnoses and all orders for this visit:    1. Vasovagal syncope (Primary)    2. Overweight (BMI 25.0-29.9)    3. TIA (transient ischemic attack)        We did speak today about the consequences of untreated sleep apnea as well as his previous diagnoses.  We also discussed his STOP-BANG questionnaire and lack of snoring, witnessed apneas, excessive daytime sleepiness, or insomnia.  I did speak with patient today about signing a Medicare waiver to have a sleep study and he respectfully declines.  I do feel he can call me back at any time should he have need and he has a very low risk of sleep apnea.    Thank you for this kind referral.      Signed by  GREGORIO Canales    July 15, 2025      CC: Nargis Rosenthal MD Clark, Jessalynn, APRN      "

## 2025-08-20 ENCOUNTER — LAB (OUTPATIENT)
Dept: INTERNAL MEDICINE | Facility: CLINIC | Age: 80
End: 2025-08-20
Payer: MEDICARE

## 2025-08-20 ENCOUNTER — OFFICE VISIT (OUTPATIENT)
Dept: INTERNAL MEDICINE | Facility: CLINIC | Age: 80
End: 2025-08-20
Payer: MEDICARE

## 2025-08-20 VITALS
OXYGEN SATURATION: 96 % | TEMPERATURE: 97.3 F | HEIGHT: 75 IN | SYSTOLIC BLOOD PRESSURE: 102 MMHG | BODY MASS INDEX: 28 KG/M2 | DIASTOLIC BLOOD PRESSURE: 66 MMHG | HEART RATE: 74 BPM | WEIGHT: 225.2 LBS | RESPIRATION RATE: 12 BRPM

## 2025-08-20 DIAGNOSIS — G60.0 CHARCOT-MARIE-TOOTH DISEASE: ICD-10-CM

## 2025-08-20 DIAGNOSIS — E78.00 PURE HYPERCHOLESTEROLEMIA: ICD-10-CM

## 2025-08-20 DIAGNOSIS — Z78.9 MEASLES, MUMPS, RUBELLA (MMR) VACCINATION STATUS UNKNOWN: ICD-10-CM

## 2025-08-20 DIAGNOSIS — Z12.5 PROSTATE CANCER SCREENING: ICD-10-CM

## 2025-08-20 DIAGNOSIS — Z00.00 ENCOUNTER FOR MEDICARE ANNUAL WELLNESS EXAM: Primary | ICD-10-CM

## 2025-08-20 DIAGNOSIS — I95.1 ORTHOSTATIC HYPOTENSION: ICD-10-CM

## 2025-08-20 LAB
ALBUMIN SERPL-MCNC: 4.3 G/DL (ref 3.5–5.2)
ALBUMIN/GLOB SERPL: 1.5 G/DL
ALP SERPL-CCNC: 80 U/L (ref 39–117)
ALT SERPL W P-5'-P-CCNC: 20 U/L (ref 1–41)
ANION GAP SERPL CALCULATED.3IONS-SCNC: 12.8 MMOL/L (ref 5–15)
AST SERPL-CCNC: 24 U/L (ref 1–40)
BILIRUB SERPL-MCNC: 0.7 MG/DL (ref 0–1.2)
BUN SERPL-MCNC: 17 MG/DL (ref 8–23)
BUN/CREAT SERPL: 21.8 (ref 7–25)
CALCIUM SPEC-SCNC: 9.7 MG/DL (ref 8.6–10.5)
CHLORIDE SERPL-SCNC: 103 MMOL/L (ref 98–107)
CHOLEST SERPL-MCNC: 180 MG/DL (ref 0–200)
CO2 SERPL-SCNC: 24.2 MMOL/L (ref 22–29)
CREAT SERPL-MCNC: 0.78 MG/DL (ref 0.76–1.27)
DEPRECATED RDW RBC AUTO: 44.8 FL (ref 37–54)
EGFRCR SERPLBLD CKD-EPI 2021: 91.9 ML/MIN/1.73
ERYTHROCYTE [DISTWIDTH] IN BLOOD BY AUTOMATED COUNT: 12.8 % (ref 12.3–15.4)
GLOBULIN UR ELPH-MCNC: 2.9 GM/DL
GLUCOSE SERPL-MCNC: 97 MG/DL (ref 65–99)
HCT VFR BLD AUTO: 45.3 % (ref 37.5–51)
HDLC SERPL-MCNC: 49 MG/DL (ref 40–60)
HGB BLD-MCNC: 15 G/DL (ref 13–17.7)
LDLC SERPL CALC-MCNC: 110 MG/DL (ref 0–100)
LDLC/HDLC SERPL: 2.2 {RATIO}
MCH RBC QN AUTO: 31.5 PG (ref 26.6–33)
MCHC RBC AUTO-ENTMCNC: 33.1 G/DL (ref 31.5–35.7)
MCV RBC AUTO: 95.2 FL (ref 79–97)
PLATELET # BLD AUTO: 170 10*3/MM3 (ref 140–450)
PMV BLD AUTO: 10.8 FL (ref 6–12)
POTASSIUM SERPL-SCNC: 4.3 MMOL/L (ref 3.5–5.2)
PROT SERPL-MCNC: 7.2 G/DL (ref 6–8.5)
PSA SERPL-MCNC: 1.88 NG/ML (ref 0–4)
RBC # BLD AUTO: 4.76 10*6/MM3 (ref 4.14–5.8)
SODIUM SERPL-SCNC: 140 MMOL/L (ref 136–145)
TRIGL SERPL-MCNC: 116 MG/DL (ref 0–150)
TSH SERPL DL<=0.05 MIU/L-ACNC: 1.68 UIU/ML (ref 0.27–4.2)
VLDLC SERPL-MCNC: 21 MG/DL (ref 5–40)
WBC NRBC COR # BLD AUTO: 5.8 10*3/MM3 (ref 3.4–10.8)

## 2025-08-20 PROCEDURE — 86765 RUBEOLA ANTIBODY: CPT | Performed by: INTERNAL MEDICINE

## 2025-08-20 PROCEDURE — 85027 COMPLETE CBC AUTOMATED: CPT | Performed by: INTERNAL MEDICINE

## 2025-08-20 PROCEDURE — 80061 LIPID PANEL: CPT | Performed by: INTERNAL MEDICINE

## 2025-08-20 PROCEDURE — 84443 ASSAY THYROID STIM HORMONE: CPT | Performed by: INTERNAL MEDICINE

## 2025-08-20 PROCEDURE — 36415 COLL VENOUS BLD VENIPUNCTURE: CPT | Performed by: INTERNAL MEDICINE

## 2025-08-20 PROCEDURE — G0103 PSA SCREENING: HCPCS | Performed by: INTERNAL MEDICINE

## 2025-08-20 PROCEDURE — 80053 COMPREHEN METABOLIC PANEL: CPT | Performed by: INTERNAL MEDICINE

## 2025-08-20 RX ORDER — GABAPENTIN 100 MG/1
CAPSULE ORAL
Qty: 30 CAPSULE | Refills: 1 | Status: SHIPPED | OUTPATIENT
Start: 2025-08-20

## 2025-08-21 LAB — MEV IGG SER IA-ACNC: 196 AU/ML
